# Patient Record
Sex: FEMALE | Race: WHITE | NOT HISPANIC OR LATINO | Employment: UNEMPLOYED | URBAN - METROPOLITAN AREA
[De-identification: names, ages, dates, MRNs, and addresses within clinical notes are randomized per-mention and may not be internally consistent; named-entity substitution may affect disease eponyms.]

---

## 2018-01-10 ENCOUNTER — OFFICE VISIT CONVERTED (OUTPATIENT)
Dept: PULMONOLOGY | Facility: CLINIC | Age: 58
End: 2018-01-10
Attending: INTERNAL MEDICINE

## 2018-02-05 ENCOUNTER — OFFICE VISIT CONVERTED (OUTPATIENT)
Dept: ORTHOPEDIC SURGERY | Facility: CLINIC | Age: 58
End: 2018-02-05
Attending: ORTHOPAEDIC SURGERY

## 2018-03-05 ENCOUNTER — OFFICE VISIT CONVERTED (OUTPATIENT)
Dept: ORTHOPEDIC SURGERY | Facility: CLINIC | Age: 58
End: 2018-03-05
Attending: ORTHOPAEDIC SURGERY

## 2018-04-05 ENCOUNTER — OFFICE VISIT CONVERTED (OUTPATIENT)
Dept: ORTHOPEDIC SURGERY | Facility: CLINIC | Age: 58
End: 2018-04-05
Attending: ORTHOPAEDIC SURGERY

## 2018-05-07 ENCOUNTER — OFFICE VISIT CONVERTED (OUTPATIENT)
Dept: ORTHOPEDIC SURGERY | Facility: CLINIC | Age: 58
End: 2018-05-07
Attending: ORTHOPAEDIC SURGERY

## 2018-06-25 ENCOUNTER — CONVERSION ENCOUNTER (OUTPATIENT)
Dept: ORTHOPEDIC SURGERY | Facility: CLINIC | Age: 58
End: 2018-06-25

## 2018-06-25 ENCOUNTER — OFFICE VISIT CONVERTED (OUTPATIENT)
Dept: ORTHOPEDIC SURGERY | Facility: CLINIC | Age: 58
End: 2018-06-25
Attending: ORTHOPAEDIC SURGERY

## 2018-07-19 ENCOUNTER — OFFICE VISIT CONVERTED (OUTPATIENT)
Dept: PULMONOLOGY | Facility: CLINIC | Age: 58
End: 2018-07-19
Attending: INTERNAL MEDICINE

## 2019-02-28 ENCOUNTER — OFFICE VISIT CONVERTED (OUTPATIENT)
Dept: PULMONOLOGY | Facility: CLINIC | Age: 59
End: 2019-02-28
Attending: INTERNAL MEDICINE

## 2019-05-16 ENCOUNTER — HOSPITAL ENCOUNTER (OUTPATIENT)
Dept: OTHER | Facility: HOSPITAL | Age: 59
Discharge: HOME | End: 2019-05-16
Attending: INTERNAL MEDICINE

## 2019-09-05 ENCOUNTER — OFFICE VISIT CONVERTED (OUTPATIENT)
Dept: PULMONOLOGY | Facility: CLINIC | Age: 59
End: 2019-09-05
Attending: PHYSICIAN ASSISTANT

## 2020-05-19 ENCOUNTER — OFFICE VISIT CONVERTED (OUTPATIENT)
Dept: PULMONOLOGY | Facility: CLINIC | Age: 60
End: 2020-05-19
Attending: INTERNAL MEDICINE

## 2020-06-02 ENCOUNTER — HOSPITAL ENCOUNTER (OUTPATIENT)
Dept: OTHER | Facility: HOSPITAL | Age: 60
Discharge: HOME | End: 2020-06-02
Attending: INTERNAL MEDICINE

## 2020-07-23 ENCOUNTER — OFFICE VISIT CONVERTED (OUTPATIENT)
Dept: ORTHOPEDIC SURGERY | Facility: CLINIC | Age: 60
End: 2020-07-23
Attending: ORTHOPAEDIC SURGERY

## 2020-07-23 ENCOUNTER — CONVERSION ENCOUNTER (OUTPATIENT)
Dept: ORTHOPEDIC SURGERY | Facility: CLINIC | Age: 60
End: 2020-07-23

## 2020-11-04 ENCOUNTER — OFFICE VISIT CONVERTED (OUTPATIENT)
Dept: PULMONOLOGY | Facility: CLINIC | Age: 60
End: 2020-11-04
Attending: INTERNAL MEDICINE

## 2021-05-13 ENCOUNTER — OFFICE VISIT CONVERTED (OUTPATIENT)
Dept: PULMONOLOGY | Facility: CLINIC | Age: 61
End: 2021-05-13
Attending: NURSE PRACTITIONER

## 2021-05-13 NOTE — PROGRESS NOTES
"   Progress Note      Patient Name: Hamida Laura   Patient ID: [id number]   Sex: Female   YOB: 1960        Visit Date: July 23, 2020    Provider: Kamron Hoffman MD   Location: [facility]   Location Address: [address] [place] [address]  [id number]   Location Phone: [phone]          Chief Complaint  · Followup Left Ankle Pain Status Post ORIF Ankle Fracture.       History Of Present Illness  Hamida Laura is a 60 year old /White female who presents today for followup of the left ankle. She is overall doing well today, but had been seeing her PCP because of some ankle pain and had reported some shooting pains up the back of the ankle, especially at night. Also has a \"red spot\" on the lateral aspect of the distal incision where a screw head is rubbing. There has been no redness, drainage, or signs of infection. She has been wearing normal shoes and has been weightbearing as tolerated on it. Her surgery was on 01/21/2018. Her last evaluation was in June 2018.       Past Medical History  Aftercare following surgery of the musculoskeletal system-- left ankle ORIF--nonunion; Aftercare following surgery of the musculoskeletal system-- status post left ankle ORIF; Ankle fracture, left; Arthritis; Chronic Obstructive Pulmonary Disease; Diabetes; Hyperlipemia; Hypertension; Left ankle pain, unspecified chronicity; Lung disease; Pain: Ankle; Thyroid disorder         Past Surgical History  I have had no surgeries         Allergy List  NO KNOWN DRUG ALLERGIES         Family Medical History  Stroke; Cancer, Unspecified         Social History  Alcohol (Current some day); Alcohol Use (Current some day); Claustophobic (Unknown); Homemaker.; lives alone; lives with children; Recreational Drug Use (Never); Tobacco (Current every day);          Review of Systems  · Constitutional  o Denies  o : fever, chills, weight loss  · Cardiovascular  o Denies  o : chest pain, shortness of " "breath  · Gastrointestinal  o Denies  o : liver disease, heartburn, nausea, blood in stools  · Genitourinary  o Denies  o : painful urination, blood in urine  · Integument  o Denies  o : rash, itching  · Neurologic  o Denies  o : headache, weakness, loss of consciousness  · Musculoskeletal  o Admits  o : painful, swollen joints  · Psychiatric  o Denies  o : drug/alcohol addiction, anxiety, depression      Vitals  Date Time BP Position Site L\R Cuff Size HR RR TEMP (F) WT  HT  BMI kg/m2 BSA m2 O2 Sat HC       07/23/2020 03:24 PM      101 - R   201lbs 4oz 5'  3\" 35.65 2.01 93 %          Physical Examination  · Constitutional  o Appearance  o : well developed, well-nourished, no obvious deformities present  · Head and Face  o Head  o :   § Inspection  § : normocephalic  o Face  o :   § Inspection  § : no facial lesions  · Eyes  o Conjunctivae  o : conjunctivae normal  o Sclerae  o : sclerae white  · Ears, Nose, Mouth and Throat  o Ears  o :   § External Ears  § : appearance within normal limits  § Hearing  § : intact  o Nose  o :   § External Nose  § : appearance normal  · Neck  o Inspection/Palpation  o : normal appearance  o Range of Motion  o : full range of motion  · Respiratory  o Respiratory Effort  o : breathing unlabored  o Inspection of Chest  o : normal appearance  o Auscultation of Lungs  o : no audible wheezing or rales  · Cardiovascular  o Heart  o : regular rate  · Gastrointestinal  o Abdominal Examination  o : soft and non-tender  · Skin and Subcutaneous Tissue  o General Inspection  o : intact, no rashes  · Psychiatric  o General  o : Alert and oriented x3  o Judgement and Insight  o : judgment and insight intact  o Mood and Affect  o : mood normal, affect appropriate  · Left Ankle/Foot  o Inspection  o : Screw heads are prominent over the distal aspect of the fibula plate, but there is no skin ulceration or wound. No signs of infection. There is a small area of skin redness over the distal screw head. " This is nontender. Range of motion is intact. Neurovascularly intact.   · In Office Procedures  o View  o : AP/LATERAL  o Site  o : left ankle  o Indication  o : Left ankle pain status post ORIF left ankle fracture  o Study  o : X-rays ordered, taken in the office, and reviewed today.  o Xray  o : Healed distal fibula fracture. Intact syndesmosis screws with no evidence of screw loosening or breakage. No evidence of hardware failure or backing out of screws.   o Comparative Data  o : Comparative Data found and reviewed today           Assessment  · Status post ORIF left trimalleolar ankle fracture with syndesmosis fixation 01/21/2018     V54.81  · Left ankle pain     719.47/M25.572  · Left ankle symptomatic hardware     996.78/T84.84XA  · History of fracture of left ankle     V15.51/Z87.81      Plan  · Orders  o Ankle (Left) [facility] Preferred View ([id number]) - 719.47/M25.572 - 07/23/2020  · Instructions  o Reviewed the patient's Past Medical, Social, and Family history as well as the ROS at today's visit, no changes.  o Call or return if worsening symptoms.  o This note was transcribed by Lizet Cleveland. jsb  o Patient will proceed with conservative treatment. She is not interested in hardware removal at this time. We discussed the risk of re-fracture with hardware removal. She will do activities as tolerated and follow up with us as needed.             Electronically Signed by: Lizet Cleveland-, Other -Author on July 27, 2020 10:01:57 PM  Electronically Co-signed by: Kamron Hoffman MD -Reviewer on July 28, 2020 10:31:53 PM

## 2021-05-15 VITALS — HEART RATE: 101 BPM | BODY MASS INDEX: 35.66 KG/M2 | OXYGEN SATURATION: 93 % | HEIGHT: 63 IN | WEIGHT: 201.25 LBS

## 2021-05-16 VITALS — HEIGHT: 62 IN | HEART RATE: 86 BPM | OXYGEN SATURATION: 95 % | BODY MASS INDEX: 39.01 KG/M2 | WEIGHT: 212 LBS

## 2021-05-16 VITALS — WEIGHT: 163 LBS | BODY MASS INDEX: 30 KG/M2 | OXYGEN SATURATION: 92 % | HEART RATE: 100 BPM | HEIGHT: 62 IN

## 2021-05-16 VITALS — HEIGHT: 62 IN | OXYGEN SATURATION: 90 % | HEART RATE: 88 BPM

## 2021-05-16 VITALS — HEART RATE: 98 BPM | OXYGEN SATURATION: 97 % | HEIGHT: 62 IN

## 2021-05-16 VITALS — HEART RATE: 84 BPM | OXYGEN SATURATION: 91 % | HEIGHT: 62 IN

## 2021-05-28 VITALS
RESPIRATION RATE: 16 BRPM | BODY MASS INDEX: 39.4 KG/M2 | OXYGEN SATURATION: 91 % | DIASTOLIC BLOOD PRESSURE: 87 MMHG | SYSTOLIC BLOOD PRESSURE: 192 MMHG | HEIGHT: 62 IN | RESPIRATION RATE: 14 BRPM | BODY MASS INDEX: 39.2 KG/M2 | TEMPERATURE: 98.4 F | RESPIRATION RATE: 18 BRPM | DIASTOLIC BLOOD PRESSURE: 64 MMHG | DIASTOLIC BLOOD PRESSURE: 72 MMHG | HEIGHT: 62 IN | HEIGHT: 63 IN | HEART RATE: 114 BPM | WEIGHT: 213 LBS | TEMPERATURE: 98.8 F | BODY MASS INDEX: 37.74 KG/M2 | OXYGEN SATURATION: 94 % | WEIGHT: 214.12 LBS | WEIGHT: 213 LBS | SYSTOLIC BLOOD PRESSURE: 154 MMHG | SYSTOLIC BLOOD PRESSURE: 140 MMHG | OXYGEN SATURATION: 92 % | HEART RATE: 100 BPM | HEART RATE: 108 BPM | TEMPERATURE: 98 F

## 2021-05-28 VITALS
TEMPERATURE: 98.2 F | HEIGHT: 63 IN | BODY MASS INDEX: 36.68 KG/M2 | DIASTOLIC BLOOD PRESSURE: 50 MMHG | OXYGEN SATURATION: 95 % | HEART RATE: 85 BPM | SYSTOLIC BLOOD PRESSURE: 112 MMHG | WEIGHT: 207 LBS | RESPIRATION RATE: 14 BRPM

## 2021-05-28 NOTE — PROGRESS NOTES
Patient: JESUS LAY     Acct: [id number]     Report: #[id number]  UNIT #: [id number]     : 1960    Encounter Date:2019  PRIMARY CARE: SONIA PETIT  ***Signed***  --------------------------------------------------------------------------------------------------------------------  Chief Complaint      Encounter Date      2019            Primary Care Provider      SONIA PETIT            Referring Provider      SONIA PETIT            Patient Complaint      Patient is complaining of      6 month follow up/soa            VITALS      Height 5 ft 3.00 in / 160.02 cm      Weight 213 lbs  / 96.106872 kg      BSA 1.99 m2      BMI 37.7 kg/m2      Temperature 98.4 F / 36.89 C - Oral      Pulse 114      Respirations 18      Blood Pressure 154/72 Sitting, Right Arm      Pulse Oximetry 92%, ROOM AIR      Initial Exhaled Nitrous Oxide      Exhaled Nitrous Oxide Results:  14            HPI      The patient is a 58 year old female with very severe COPD, chronic smoking     history, history of obesity hypoventilation syndrome and mild obstructive sleep     apnea. She is here for follow up.  She has an FEV1 of 1.07 liters, 30% of     predicted.  She is currently taking Symbicort and Incruse and it is helping. She    has rarely needed any rescue inhaler. The last time she used it was more than     two months ago. She has not needed any antibiotics or steroids.  She continues     to smoke several cigarettes a day.  She has tried nicotine patches and gum, but     has not helped her to quit all together, she is slowly working on it.  She     continues to use BiPAP. Her old BiPAP was not working and she needed supplies,     so when she went to pharmacy she received a new BiPAP machine. her BiPAP setting    is 20/15.  She had low dose lung cancer screening last year which was normal.     She is due for low dose lung cancer screening next month. She wants Mucinex     which has helped her when she  received it from her PCP.            ROS      Constitutional:  Complains of: Fatigue; Denies: Fever, Weight gain, Weight loss,    Chills, Insomnia, Other      Respiratory/Breathing:  Complains of: Shortness of air; Denies: Wheezing, Cough,    Hemoptysis, Pleuritic pain, Other      Endocrine:  Denies: Polydipsia, Polyuria, Heat/cold intolerance, Abnorml     menstrual pattern, Diabetes, Other      Eyes:  Denies: Blurred vision, Vision Changes, Other      Ears, nose, mouth, throat:  Denies: Mouth lesions, Thrush, Throat pain,     Hoarseness, Allergies/Hay Fever, Post Nasal Drip, Headaches, Recent Head Injury,    Nose Bleeding, Neck Stiffness, Thyroid Mass, Hearing Loss, Ear Fullness, Dry     Mouth, Nasal or Sinus Pain, Dry Lips, Nasal discharge, Nasal congestion, Other      Cardiovascular:  Denies: Palpitations, Syncope, Claudication, Chest Pain, Wake     up Gasping for air, Leg Swelling, Irregular Heart Rate, Cyanosis, Dyspnea on     Exertion, Other      Gastrointestinal:  Denies: Nausea, Constipation, Diarrhea, Abdominal pain,     Vomiting, Difficulty Swallowing, Reflux/Heartburn, Dysphagia, Jaundice,     Bloating, Melena, Bloody stools, Other      Genitourinary:  Denies: Urinary frequency, Incontinence, Hematuria, Urgency,     Nocturia, Dysuria, Testicular problems, Other      Musculoskeletal:  Denies: Joint Pain, Joint Stiffness, Joint Swelling, Myalgias,    Other      Hematologic/lymphatic:  DENIES: Lymphadenopathy, Bruising, Bleeding tendencies,     Other      Neurological:  Denies: Headache, Numbness, Weakness, Seizures, Other      Psychiatric:  Denies: Anxiety, Appropriate Effect, Depression, Other      Sleep:  No: Excessive daytime sleep, Morning Headache?, Snoring, Insomnia?, Stop    breathing at sleep?, Other      Integumentary:  Denies: Rash, Dry skin, Skin Warm to Touch, Other      Immunologic/Allergic:  Denies: Latex allergy, Seasonal allergies, Asthma,     Urticaria, Eczema, Other      Immunization  status:  No: Up to date            FAMILY/SOCIAL/MEDICAL HX      Surgical History:  Yes: Abdominal Surgery, Oral Surgery (TEETH EXTRACTION); No:     AAA Repair, Angioplasty, Appendectomy, Back Surgery, Bladder Surgery, Bowel     Surgery, Breast Surgery, CABG, Carotid Stenosis, Cholecystectomy, Ear Surgery,     Eye Surgery, Head Surgery, Hernia Surgery, Kidney Surgery, Nose Surgery,     Orthopedic Surgery, Prostatectomy, Rectal Surgery, Spinal Surgery, Testicular     Surgery, Throat Surgery, Valve Replacement, Vascular Surgery, Other Surgeries      Stroke - Family Hx:  Brother      Cancer/Type - Family Hx:  Mother      Is Father Still Living?:  No      Is Mother Still Living?:  No       Family History:  Yes      Social History:  Tobacco Use; No Alcohol Use, No Recreational Drug use      Smoking status:  Current every day smoker ( 1 ppd for 40 years )      Anticoagulation Therapy:  No      Antibiotic Prophylaxis:  No      Medical History:  Yes: Chronic Bronchitis/COPD, Diabetes (TYPE II), High Blood     Pressure, Thyroid Problem; No: Alcoholism, Allergies, Anemia, Arthritis, Asthma,    Blood Disease, Broken Bones, Cataracts, Chemical Dependency,     Chemotherapy/Cancer, Emphysema, Chronic Liver Disease, Colon Trouble, Colitis,     Diverticulitis, Congestive Heart Failu, Deafness or Ringing Ears, Convulsions,     Depression, Anxiety, Bipolar Disorder, Epilepsy, Seizures, Forgetfullness,     Glaucoma, Gall Stones, Gout, Head Injury, Heart Attack, Heart Murmur,     Hemorrhoids/Rectal Prob, Hepatitis, Hiatal Hernia, High Cholesterol, HIV (Do not    ask - volu, Jaundice, Kidney or Bladder Disease, Kidney Stones, Migrane     Headaches, Mitral Valve Prolapse, Night sweats, Phlebitis, Psychiatric Care,     Reflux Disease, Rheumatic Fever, Sexually Transmitted Dis, Shortness Of Breath,     Sinus Trouble, Skin Disease/Psoriais/Ecz, Stroke, Tuberculosis or Pos TB Te,     Miscellaneous Medical/oth (HIGH CHOLESTEROL)       Psychiatric History      none            PREVENTION      Hx Influenza Vaccination:  No      Date Influenza Vaccine Given:  Sep 1, 2018      Influenza Vaccine Declined:  Yes      2 or More Falls Past Year?:  No      Fall Past Year with Injury?:  No      Hx Pneumococcal Vaccination:  Yes      Encouraged to follow-up with:  PCP regarding preventative exams.      Chart initiated by      gilda joe ma            ALLERGIES/MEDICATIONS      Allergies:        Coded Allergies:             NO KNOWN ALLERGIES (Unverified , 2/28/19)      Medications    Last Reconciled on 2/28/19 13:15 by REYMUNDO LOMAX MD      Lisinopril* (Lisinopril*) 5 Mg Tablet      5 MG PO QDAY, #60 TAB 0 Refills         Reported         7/19/18       Metformin Hcl* (Metformin Hcl*) 850 Mg Tablet      850 MG PO QDAY, #30 TAB 0 Refills         Reported         1/20/18       Levothyroxine (Levothyroxine) 0.15 Mg Tablet      0.15 MG PO QDAY@07, #30 TAB 0 Refills         Reported         1/20/18       Nicotine 21 Mg Patch (Nicoderm 21 Mg Patch) 1 Each Patch.td24      21 MG TRANSDERM QDAY for 30 Days, #30 PATCH 3 Refills         Prov: Reymundo Lomax         1/10/18       Umeclidinium Bromide (Incruse Ellipta) 62.5 Mcg Blst.w.dev      1 PUFF INH RTQDAY, #1 MDI 3 Refills         Prov: Reymundo Lomax         11/20/17       MDI-Albuterol (Ventolin HFA) 8 Gm Hfa.aer.ad      2 PUFFS INH Q4-6H PRN for DYSPNEA, #1 INH 6 Refills         Prov: Reymundo Lomax         8/4/17       Budesonide/Formoterol Fumarate (Symbicort 160/4.5 Mcg) 10.2 Gm Inh      2 PUFF INH BID, #1 INH 9 Refills         Prov: Reymundo Lomax         8/4/17       Albuterol Sulfate (Albuterol Sulfate) 1.25 Mg/3 Ml Vial.neb      1.25 MG INH Q4-6H PRN for SHORTNESS OF BREATH, #120 NEB         Prov: LIBERTAD CARMEN         9/10/16       Alprazolam (Alprazolam) 0.5 Mg Tablet      0.5 MG PO TID PRN for ANXIETY, #90 TAB         Reported         9/8/16       Simvastatin (Simvastatin*) 20 Mg Tablet      20 MG PO HS,  #30 TAB 0 Refills         Reported         9/8/16       amLODIPine (amLODIPine) 5 Mg Tablet      5 MG PO QDAY, #30 TAB         Reported         9/8/16      Current Medications      Current Medications Reviewed 2/28/19            EXAM      CONSTITUTIONAL: Pleasant morbidly obese female with plethoric look in no acute     distress, normal conversant. She is very tearful  during our conversation.        EYES : Pink conjunctive, no ptosis, PERRL.       ENMT :Mallampati classification IV, no sinus tenderness.  Nose and ears appear     normal, normal dentition, mild posterior pharyngeal wall erythema.      Neck: Nontender, no masses, no thyromegaly, no nodules.      Resp : Bilateral diminished  breath sounds, resonant to percussion bilaterally,     no wheezing, crackles or rhonchi.      CVS  : No carotid bruits, s1s2 nl, RRR, no murmur, rubs or gallop, trace pedal     edema in bilateral lower extremities, nontender on palpation.       Chest wall: Increased AP diameter.  Normal rise with inspiration, nontender on     palpation      GI   : Abdomen soft, with no masses, no hepatosplenomegaly, no hernias, BS+      MSK  : Normal gait and station, no digital cyanosis or clubbing       Skin : No rashes, ulcerations or lesions, normal turgor and temperature      Neuro: CN II - XII intact, no sensory deficits, DTRs intact and symmetrical, no     motor weakness      Psych: Appropriate affect, A   Vtials      Vitals:             Height 5 ft 3.00 in / 160.02 cm           Weight 213 lbs  / 96.567565 kg           BSA 1.99 m2           BMI 37.7 kg/m2           Temperature 98.4 F / 36.89 C - Oral           Pulse 114           Respirations 18           Blood Pressure 154/72 Sitting, Right Arm           Pulse Oximetry 92%, ROOM AIR            REVIEW      Results Reviewed      P[facility]S Results Reviewed?:  Yes Prev Lab Results, Yes Prev Radiology Results, Yes P    revious Mecial Records      Radiographic Results               [facility]  [facility]                PACS RADIOLOGY REPORT            Patient: JESUS LAY   Acct: #[id number]   Report: #[id number]            UNIT #: [id number]    DOS: 18      INSURANCE:ADOP   ORDER #:[id number]      LOCATION:[facility]     : 1960            PROVIDERS      ADMITTING:     ATTENDING: Reymundo Lomax      FAMILY:  NONE,MD   ORDERING:  Reymundo Lomax         OTHER:    DICTATING:  Patricio Taylor MD            REQ #:[id number]   EXAM:[facility] - LOW DOSE CHEST CT SCREENING      REASON FOR EXAM:        REASON FOR VISIT:  FORMER SMOKER            *******Signed******         PROCEDURE:   CT LOW DOSE CHEST SCREENING             COMPARISON:   None.             REASON FOR SCREENING:   Patient is 57 years of age, asymptomatic, and has a     smoking history of more       than 30 pack years.      SMOKING STATUS:   Former smoker.  Years since quitting:                 SCREENING VISIT:   Year 1.                   TECHNIQUE:   Axial unenhanced LDCT images from the apices through mid-kidney     were obtained.       Evaluation of solid organs and vascular structures is suboptimal due to the lack    of IV contrast.       Imaging was performed on a Siemens Emotion CT scanner.             RADIATION:   CT Dose Index Vol (CTDIvol):    3.11  mGy         Dose Length Product (DLP):    110  mGy-cm             DIAGNOSTIC QUALITY:   Satisfactory.               FINDINGS:      No well-defined consolidations or significant pleural effusions are observed.      Mild to moderate       bullous changes are seen throughout the lungs most pronounced in the lung     apices.  The findings       indicate mild to moderate emphysema.  No dominant pulmonary nodules or abnormal     pulmonary masses       are seen.              No significant hilar, mediastinal, or axillary lymphadenopathy is seen. A normal    aortic arch       branching pattern is noted.  Moderate  atherosclerosis is noted.  Severe coronary    artery       calcifications are identified.  The thyroid gland is unremarkable. The esophagus    is unremarkable.             The limited evaluation of the upper abdomen demonstrates no definitive acute     abnormalities.  There       is a low-density focus involving the left adrenal gland.  This finding     demonstrates fat attenuation       and is consistent with an incidental adenoma.  The finding measures 2.5 cm.             No acute osseous abnormalities are seen.                CONCLUSION:         1. No evidence for acute intrathoracic abnormality.      2. Evidence for mild to moderate emphysema.      3. No dominant pulmonary nodules or masses are identified.      4. Evidence for severe coronary artery calcifications.      5. Note is made of an incidental left adrenal adenoma.             LUNG-RADS CLASSIFICATION:   Lung rads category 2, benign findings.  Intermittent    yearly screening may       be considered.              MINISTERIO SOLIZ MD             Electronically Signed and Approved By: MINISTERIO SOLIZ MD on 3/07/2018 at 10:50                           Until signed, this is an unconfirmed preliminary report that may contain      errors and is subject to change.                                              HAZDA:      D:03/07/18 1050      PFT Results      [id number]  [id number]                                       [facility]                          [employer]                            [place], [place]  [address]               __________________________________________________________________________             Patient Name:                   Attending Physician:      Hamida Laura M.D.             Patient Visit # MR #            Admit Date  Disch Date     Location      [id number]    [id number]      09/29/2016                 [facility]- -             Date of Birth       1960      __________________________________________________________________________      821 - DIAGNOSTIC REPORT             PULMONARY FUNCTION TEST             DATE OF SERVICE:      9/29/2016             PRIMARY CARE PHYSICIAN:      Leno Hidalgo M.D.             SPIROMETRY:      Spirometry shows severe obstructive process.      FEV1/FVC ratio is 58%.      FEV1 is 1.07 L, 38% of predicted.      FVC is 1.85 L, 52% of predicted.      Bronchodilator response:  There is a significant response to bronchodilator      administration. FEV increased from 1.07 L to 1.30 L, 22% increase.  FVC      increased from 1.85 L to 2.29 L, 24% change.             LUNG VOLUMES:      Lung volumes show air trapping.      Total lung capacity is 5.19 L, 99% of predicted.      Residual volume is 3.13 L, 161% of predicted.             DIFFUSION:      Diffusion capacity is moderately decreased at 58% of predicted.             FLOW VOLUME LOOP:      Flow volume loop is compatible with obstructive process.             CONCLUSION:        1. Low FEV1/FVC with low FEV1 and FVC with air trapping and low diffusion           capacities, suggestive of severe obstructive airway disease, likely           emphysema.        2. There is some reversible component to her obstructive airway disease.        3. Please correlate clinically.             To be electronically signed in [employer]      [id number] REYMUNDO LOMAX M.D.             NK:rupa      D:  10/04/2016 16:23      T:  10/04/2016 19:16      #[id number]             Until signed, this is an unconfirmed preliminary report that may contain      errors and is subject to change.                   10/06/16 1404  <Electronically signed by Reymundo Lomax MD>            Assessment      Notes      Changed Medications      * Lisinopril* 5 MG TABLET: 5 MG PO QDAY #60      Discontinued Medications      * UMECLIDINIUM BROMIDE (Incruse Ellipta) 62.5 MCG BLST.W.DEV: 1 PUFF INH RTQDAY       #1      * Fluticasone/Vilanterol  200-25 Mcg Inh (Breo Ellipta 200-25 Mcg Inh) 1 EACH       BLST.W.DEV: 1 PUFF INH QDAY 30 Days #1      New Diagnostics      * Low Dose Chest CT, SCHEDULED PROCEDURE         Dx: Tobacco use disorder - F17.200      New Office Procedures      * Pneumovax-23, As Soon As Possible         Pneumococcal Vaccine Polyvalen (Pneumovax-23) 25 MCG/0.5 ML VIAL: 25        MICROGRAM INTRAMUSCULARLY Qty 25 MCG      PLAN:      The patient is a 58 year old female with very severe chronic obstructive     pulmonary disease, chronic smoking history, history of  obesity hyperventilation    syndrome and mild obstructive sleep apnea.             1. Chronic obstructive pulmonary disease. FEV1 is at 1.07 liters, 30% predicted.    Continue with Symbicort two puffs twice daily, Incruse daily and albuterol as     needed.  She needs to quit smoking.              2. Smoking cessation. Counseling was done for more than 5 minutes. She has     nicotine patch and gum at home, but continues to smoke several cigarettes a day.     She is going to work on it slowly.  We have set a quit date prior to next     office visit, but she says cant, she has tried it in the past and is not able to    .              3. Obesity hyperventilation syndrome and obstructive sleep apnea. Continue with     BiPAP 20/15. We will try to obtain the records from [facility] regarding     her BiPAP usage. She says she has good adherence to it and it does help her     significantly. We will give her Mucinex for one week.  We will administered     Pneumovax today.             4.  I will order low dose lung cancer screening CT scan of the chest for next     month.              5. I will follow up with her in 6 months, earlier if needed.            Patient Education      Education resources provided:  Yes      Patient Education Provided:  Acute Bronchitis                 Disclaimer: Converted document may not contain table formatting or lab diagrams. Please see [employer] for the authenticated document.

## 2021-05-28 NOTE — PROGRESS NOTES
Patient: JESUS LAY     Acct: [id number]     Report: #[id number]  UNIT #: [id number]     : 1960    Encounter Date:2019  PRIMARY CARE: SONIA PETIT  ***Signed***  --------------------------------------------------------------------------------------------------------------------  Chief Complaint      Encounter Date      Sep 5, 2019            Primary Care Provider      SONIA PETIT            Referring Provider      SONIA PETIT            Patient Complaint      Patient is complaining of      Patient here today for 6 month follow up            VITALS      Height 63 in / 160.02 cm      Weight 207 lbs  / 93.342877 kg      BSA 1.96 m2      BMI 36.7 kg/m2      Temperature 98.2 F / 36.78 C - Oral      Pulse 85      Respirations 14      Blood Pressure 112/50 Sitting, Left Arm      Pulse Oximetry 95%, room air      Initial Exhaled Nitrous Oxide      Exhaled Nitrous Oxide Results:  14            HPI      The patient is a very pleasant 59 year old white female patient of Dr. Lomax's     here for 6 month follow up. She has a history of severe chronic obstructive     pulmonary disease, chronic smoking, smoking 3-5 cigarettes per day, history of     obesity hyperventilation syndrome on nightly BiPAP and obstructive sleep apnea.     She is here today stating she has done fairly well in the past 6 months. She has    been on Symbicort 160 and incruse but recently got a letter saying [employer] will    no longer cover her Symbicort and she needs to be changed to advair. She feels     like her allergies are bothering her more recently. She has had some mildly     increased dry cough. She is taking Mucinex and using her albuterol breathing     treatments and it is almost resolved. She denies hemoptysis, fever or chills,     wheezing or  purulent sputum production. She denies increased dyspnea. She had     low dose lung cancer screening CT scan in May 2019 that I reviewed with her. She    had a tiny  1-2 mm nodule in the right lower lobe that is stable. There were no     new or suspicious nodules seen.             I reviewed her Review of Systems, medical, surgical and family history and agree    with those as entered.      Copies To:   Reymundo Lomax      Constitutional:  Denies: Fatigue, Fever, Weight gain, Weight loss, Chills,     Insomnia, Other      Respiratory/Breathing:  Complains of: Cough; Denies: Shortness of air, Wheezing,    Hemoptysis, Pleuritic pain, Other      Endocrine:  Denies: Polydipsia, Polyuria, Heat/cold intolerance, Abnorml men    strual pattern, Diabetes, Other      Eyes:  Denies: Blurred vision, Vision Changes, Other      Ears, nose, mouth, throat:  Denies: Congestion, Dysphagia, Hearing Changes, Nose    Bleeding, Nasal Discharge, Throat pain, Tinnitus, Other      Cardiovascular:  Denies: Chest Pain, Exertional dyspnea, Peripheral Edema,     Palpitations, Syncope, Wake up Gasping for air, Orthopnea, Tachycardia, Other      Gastrointestinal:  Denies: Abdominal pain/cramping, Bloody stools, Constipation,    Diarrhea, Melena, Nausea, Vomiting, Other      Genitourinary:  Denies: Dysuria, Urinary frequency, Incontinence, Hematuria,     Urgency, Other      Musculoskeletal:  Denies: Joint Pain, Joint Stiffness, Joint Swelling, Myalgias,    Other      Hematologic/lymphatic:  DENIES: Lymphadenopathy, Bruising, Bleeding tendencies,     Other      Neurologic:  Denies: Headache, Numbness, Weakness, Seizures, Other      Psychiatric:  Denies: Anxiety, Appropriate Effect, Depression, Other      Sleep:  No: Excessive daytime sleep, Morning Headache?, Snoring, Insomnia?, Stop    breathing at sleep?, Other      Integumentary:  Denies: Rash, Dry skin, Skin Warm to Touch, Other            FAMILY/SOCIAL/MEDICAL HX      Surgical History:  Yes: Abdominal Surgery, Oral Surgery (TEETH EXTRACTION); No:     AAA Repair, Angioplasty, Appendectomy, Back Surgery, Bladder Surgery, Bowel     Surgery,  Breast Surgery, CABG, Carotid Stenosis, Cholecystectomy, Ear Surgery,     Eye Surgery, Head Surgery, Hernia Surgery, Kidney Surgery, Nose Surgery,     Orthopedic Surgery, Prostatectomy, Rectal Surgery, Spinal Surgery, Testicular     Surgery, Throat Surgery, Valve Replacement, Vascular Surgery, Other Surgeries      Stroke - Family Hx:  Brother      Cancer/Type - Family Hx:  Mother      Is Father Still Living?:  No      Is Mother Still Living?:  No       Family History:  Yes      Social History:  Tobacco Use; No Alcohol Use, No Recreational Drug use      Smoking status:  Current every day smoker ( 1 ppd for 40 years )      Anticoagulation Therapy:  No      Antibiotic Prophylaxis:  No      Medical History:  Yes: Chronic Bronchitis/COPD, Diabetes (TYPE II), High Blood     Pressure, Thyroid Problem; No: Alcoholism, Allergies, Anemia, Arthritis, Asthma,    Blood Disease, Broken Bones, Cataracts, Chemical Dependency,     Chemotherapy/Cancer, Emphysema, Chronic Liver Disease, Colon Trouble, Colitis,     Diverticulitis, Congestive Heart Failu, Deafness or Ringing Ears, Convulsions,     Depression, Anxiety, Bipolar Disorder, Epilepsy, Seizures, Forgetfullness,     Glaucoma, Gall Stones, Gout, Head Injury, Heart Attack, Heart Murmur,     Hemorrhoids/Rectal Prob, Hepatitis, Hiatal Hernia, High Cholesterol, HIV (Do not    ask - volu, Jaundice, Kidney or Bladder Disease, Kidney Stones, Migrane     Headaches, Mitral Valve Prolapse, Night sweats, Phlebitis, Psychiatric Care,     Reflux Disease, Rheumatic Fever, Sexually Transmitted Dis, Shortness Of Breath,     Sinus Trouble, Skin Disease/Psoriais/Ecz, Stroke, Tuberculosis or Pos TB Te,     Miscellaneous Medical/oth (HIGH CHOLESTEROL)      Psychiatric History      none            PREVENTION      Hx Influenza Vaccination:  No      Date Influenza Vaccine Given:  Sep 1, 2018      Influenza Vaccine Declined:  Yes      2 or More Falls Past Year?:  No      Fall Past Year with Injury?:   No      Hx Pneumococcal Vaccination:  Yes      Encouraged to follow-up with:  PCP regarding preventative exams.      Chart initiated by      Alison Head CMA            ALLERGIES/MEDICATIONS      Allergies:        Coded Allergies:             NO KNOWN ALLERGIES (Unverified , 9/5/19)      Medications    Last Reconciled on 9/5/19 11:35 by FREDRICK SCHAEFFER      Lisinopril* (Lisinopril*) 5 Mg Tablet      5 MG PO QDAY, #60 TAB 0 Refills         Reported         7/19/18       Metformin Hcl* (Metformin Hcl*) 850 Mg Tablet      850 MG PO QDAY, #30 TAB 0 Refills         Reported         1/20/18       Levothyroxine (Levothyroxine) 0.15 Mg Tablet      0.15 MG PO QDAY@07, #30 TAB 0 Refills         Reported         1/20/18       Umeclidinium Bromide (Incruse Ellipta) 62.5 Mcg Blst.w.dev      1 PUFF INH RTQDAY, #1 MDI 3 Refills         Prov: Reymundo Lomax         11/20/17       MDI-Albuterol (Ventolin HFA) 8 Gm Hfa.aer.ad      2 PUFFS INH Q4-6H PRN for DYSPNEA, #1 INH 6 Refills         Prov: Reymundo Lomax         8/4/17       Budesonide/Formoterol Fumarate (Symbicort 160/4.5 Mcg) 10.2 Gm Inh      2 PUFF INH BID, #1 INH 9 Refills         Prov: Reymundo Lomax         8/4/17       Albuterol Sulfate (Albuterol Sulfate) 1.25 Mg/3 Ml Vial.neb      1.25 MG INH Q4-6H PRN for SHORTNESS OF BREATH, #120 NEB         Prov: LIBERTAD CARMEN         9/10/16       Alprazolam (Alprazolam) 0.5 Mg Tablet      0.5 MG PO TID PRN for ANXIETY, #90 TAB         Reported         9/8/16       Simvastatin (Simvastatin*) 20 Mg Tablet      20 MG PO HS, #30 TAB 0 Refills         Reported         9/8/16       amLODIPine (amLODIPine) 5 Mg Tablet      5 MG PO QDAY, #30 TAB         Reported         9/8/16      Current Medications      Current Medications Reviewed 9/5/19            EXAM      CONSTITUTIONAL: Pleasant  normal conversant.       EYES : Pink conjunctive, no ptosis, PERRL.       ENMT : Nose and ears appear normal, normal dentition, mild posterior pharyngeal      wall erythema, no sinus tenderness. Mallampati classification       Neck: Nontender, no masses, no thyromegaly, no nodules.      Resp : Mildly decreased breath sounds throughout, no wheezes, rhonchi or     crackles, normal work of breathing noted.        CVS  : No carotid bruits, s1s2 nl, RRR, no murmur, rubs or gallop, no peripheral    edema       Chest wall: Normal rise with inspiration, nontender on palpation.      GI   : Abdomen soft, with no masses, no hepatosplenomegaly, no hernias, BS+      MSK  : Normal gait and station, no digital cyanosis or clubbing       Skin : No rashes, ulcerations or lesions, normal turgor and temperature      Neuro: CN II - XII intact, no sensory deficits, DTRs intact and symmetrical, no     motor weakness      Psych: Appropriate affect, A   Vitals      Vitals:             Height 63 in / 160.02 cm           Weight 207 lbs  / 93.159734 kg           BSA 1.96 m2           BMI 36.7 kg/m2           Temperature 98.2 F / 36.78 C - Oral           Pulse 85           Respirations 14           Blood Pressure 112/50 Sitting, Left Arm           Pulse Oximetry 95%, room air            REVIEW      Results Reviewed      [facility] Results Reviewed?:  Yes Prev Lab Results, Yes Prev Radiology Results, Yes     Previous [facility] Records            Assessment      Notes      New Medications      * MDI-Advair 500/50 (Advair 500/50 Diskus) 1 EACH BLST.W.DEV: 1 PUFF INH RTBID       #1      * Nicotine Polacrilex (Nicotine) 2 MG LOZENGE: 2 MG BUCCAL Q4-6H #180      Renewed Medications      * Albuterol Sulfate 1.25 MG/3 ML VIAL.NEB: 1.25 MG INH Q4-6H PRN SHORTNESS OF       BREATH #120      Discontinued Medications      * Budesonide/Formoterol Fumarate (Symbicort 160/4.5 Mcg) 10.2 GM INH: 2 PUFF INH      BID #1      * Nicotine 21 Mg Patch (Nicoderm 21 Mg Patch) 1 EACH PATCH.TD24: 21 MG TRANSDERM      QDAY 30 Days #30      ASSESSMENT:       1. Severe chronic obstructive pulmonary disease without acute exacerbation.        2. Ongoing tobacco abuse of cigarettes.       3. Obesity hyperventilation syndrome and obstructive sleep apnea on nightly     BiPAP        4. Morbid obesity with BMI 36.7.            PLAN:      1. Continue incruse 1 puff daily and due to her insurance coverage changing, I     will change Symbicort 160 to advair discus 500/50 1 puff twice daily. Continue     albuterol as needed. I instructed her to try advair for at least 1 month and if     she feels like she does worse or it does not help, she is to let us know and we     will document if she fails advair to try to get Symbicort covered for her.       2. I counseled the patient on smoking cessation for 5 minutes.  I offered     nicotine replacement therapy and  pharmacotherapy. She wishes to try nicotine     lozenges so I have prescribed those today. I encouraged her to stop smoking all     together and she verbalized understanding.       3. Continue BiPAP at current settings. I will review her BiPAP usage when it is     available.       4. I discussed lung cancer screening CT scan of the chest with her and she had n    o new or suspicious nodules. There is only a tiny 1-2 mm nodule that is     unchanged in the right lower lobe. Continue annual lung cancer screening.       5. I encouraged her to increase her activity as tolerated and work on weight     loss by decreasing her caloric intake. I offered dietitian referral but she     declines.       6. Follow up in 6 months with Dr. Lomax, sooner if needed.            Patient Education      ACO BMI High above 25:  Counseling Given, Encouraged weight loss, Encourage     dietary changes      Tobacco Cessation Counseling:  for 3 - 10 minutes      Patient Education Provided:  COPD, How to use an Inhaler, How to use a     Nebulizer, Smoking Cessation      Time Spent:  > 50% /[facility]                 Disclaimer: Converted document may not contain table formatting or lab diagrams. Please see [employer] for the authenticated document.

## 2021-05-28 NOTE — PROGRESS NOTES
Patient: JESUS LAY     Acct: [id number]     Report: #[id number]  UNIT #: [id number]     : 1960    Encounter Date:2018  PRIMARY CARE: SONIA PETIT  ***Signed***  --------------------------------------------------------------------------------------------------------------------  Chief Complaint      Encounter Date      2018            Primary Care Provider      SONIA PETIT            Referring Provider      SONIA PETIT            Patient Complaint      Patient is complaining of      3 month follow up            VITALS      Height 62 in / 157.48 cm      Weight 213 lbs 0 oz / 96.621506 kg      BSA 2.11 m2      BMI 39.0 kg/m2      Temperature 98.0 F / 36.67 C - Oral      Pulse 100      Respirations 16      Blood Pressure 140/64 Sitting, Right Arm      Pulse Oximetry 94%, room air      Exhaled Nitrous Oxide Testin            HPI      The patient is a 58 year old female with  very severe chronic obstructive     pulmonary disease, chronic smoking history, obesity hypoventilation syndrome     and mild obstructive sleep apnea. She is here for follow up.             Since her last office visit in 2018 she had tibia and fibula fracture     and needed surgery. Since then she has been in rehab with the orthopedic     doctors. She continues to smoke 10-20 cigarettes a day. She has been using     BiPAP with sleep . She gets her supplies through [facility] pharmacy.     She uses a full face mask. She continues to take Symbicort two puffs twice daily    , Incruse once daily and albuterol as needed.  She has sparingly needed     albuterol at all. She has no weight loss or loss of appetite, no coughing up     blood, no nausea and vomiting. She had low dose CT scan of the chest done on . The results of the test was reviewed with her today. She was very     tearful about daughter's miscarriage recently. She says she has a lot of stress     and is not able  to stop smoking right now. She has nicotine patch and gum at     home.            ROS      Constitutional:  Complains of: Fatigue, Denies: Fever, Weight gain, Weight loss    , Chills, Insomnia, Other      Respiratory/Breathing:  Complains of: Shortness of air, Denies: Wheezing, Cough    , Hemoptysis, Pleuritic pain, Other      Endocrine:  Denies: Polydipsia, Polyuria, Heat/cold intolerance, Abnorml     menstrual pattern, Diabetes, Other      Eyes:  Denies: Blurred vision, Vision Changes, Other      Ears, nose, mouth, throat:  Denies: Mouth lesions, Thrush, Throat pain,     Hoarseness, Allergies/Hay Fever, Post Nasal Drip, Headaches, Recent Head Injury    , Nose Bleeding, Neck Stiffness, Thyroid Mass, Hearing Loss, Ear Fullness, Dry     Mouth, Nasal or Sinus Pain, Dry Lips, Nasal discharge, Nasal congestion, Other      Cardiovascular:  Denies: Palpitations, Syncope, Claudication, Chest Pain, Wake     up Gasping for air, Leg Swelling, Irregular Heart Rate, Cyanosis, Dyspnea on     Exertion, Other      Gastrointestinal:  Denies: Nausea, Constipation, Diarrhea, Abdominal pain,     Vomiting, Difficulty Swallowing, Reflux/Heartburn, Dysphagia, Jaundice, Bloating    , Melena, Bloody stools, Other      Genitourinary:  Denies: Urinary frequency, Incontinence, Hematuria, Urgency,     Nocturia, Dysuria, Testicular problems, Other      Musculoskeletal:  Denies: Joint Pain, Joint Stiffness, Joint Swelling, Myalgias    , Other      Hematologic/lymphatic:  DENIES: Lymphadenopathy, Bruising, Bleeding tendencies,     Other      Neurological:  Denies: Headache, Numbness, Weakness, Seizures, Other      Psychiatric:  Denies: Anxiety, Appropriate Effect, Depression, Other      Sleep:  No: Excessive daytime sleep, Morning Headache?, Snoring, Insomnia?,     Stop breathing at sleep?, Other      Integumentary:  Denies: Rash, Dry skin, Skin Warm to Touch, Other      Immunologic/Allergic:  Denies: Latex allergy, Seasonal allergies, Asthma,      Urticaria, Eczema, Other      Immunization status:  No: Up to date            FAMILY/SOCIAL/MEDICAL HX      Surgical History:  Yes: Abdominal Surgery, Oral Surgery (TEETH EXTRACTION), No:     AAA Repair, Angioplasty, Appendectomy, Back Surgery, Bladder Surgery, Bowel     Surgery, Breast Surgery, CABG, Carotid Stenosis, Cholecystectomy, Ear Surgery,     Eye Surgery, Head Surgery, Hernia Surgery, Kidney Surgery, Nose Surgery,     Orthopedic Surgery, Prostatectomy, Rectal Surgery, Spinal Surgery, Testicular     Surgery, Throat Surgery, Valve Replacement, Vascular Surgery, Other Surgeries      Stroke - Family Hx:  Brother      Cancer/Type - Family Hx:  Mother      Is Father Still Living?:  No      Is Mother Still Living?:  No       Family History:  Yes      Social History:  Tobacco Use, No Alcohol Use, No Recreational Drug use      Smoking status:  Current every day smoker ( 1 ppd for 40 years )      Anticoagulation Therapy:  No      Antibiotic Prophylaxis:  No      Medical History:  Yes: Chronic Bronchitis/COPD, Diabetes (TYPE II), High Blood     Pressure, Thyroid Problem, No: Alcoholism, Allergies, Anemia, Arthritis, Asthma    , Blood Disease, Broken Bones, Cataracts, Chemical Dependency, Chemotherapy/    Cancer, Emphysema, Chronic Liver Disease, Colon Trouble, Colitis, Diverticulitis    , Congestive Heart Failu, Deafness or Ringing Ears, Convulsions, Depression,     Anxiety, Bipolar Disorder, Epilepsy, Seizures, Forgetfullness, Glaucoma, Gall     Stones, Gout, Head Injury, Heart Attack, Heart Murmur, Hemorrhoids/Rectal Prob,     Hepatitis, Hiatal Hernia, High Cholesterol, HIV (Do not ask - volu, Jaundice,     Kidney or Bladder Disease, Kidney Stones, Migrane Headaches, Mitral Valve     Prolapse, Night sweats, Phlebitis, Psychiatric Care, Reflux Disease, Rheumatic     Fever, Sexually Transmitted Dis, Shortness Of Breath, Sinus Trouble, Skin     Disease/Psoriais/Ecz, Stroke, Tuberculosis or Pos TB Te, Miscellaneous  Medical/    oth (HIGH CHOLESTEROL)      Psychiatric History      none            PREVENTION      Hx Influenza Vaccination:  No      Date Influenza Vaccine Given:  Sep 19, 2016      Influenza Vaccine Declined:  Yes      2 or More Falls Past Year?:  No      Fall Past Year with Injury?:  No      Hx Pneumococcal Vaccination:  No      Encouraged to follow-up with:  PCP regarding preventative exams.      Chart initiated by      gilda joe ma            ALLERGIES/MEDICATIONS      Allergies:        Coded Allergies:             NO KNOWN ALLERGIES (Unverified , 7/19/18)      Medications    Last Reconciled on 7/19/18 15:58 by REYMUNDO LOMAX MD      Umeclidinium Bromide (Incruse Ellipta) 62.5 Mcg Blst.w.dev      1 PUFF INH RTQDAY, #1 MDI 9 Refills         Prov: Reymundo Lomax         7/19/18       Budesonide/Formoterol Fumarate (Symbicort 160/4.5 Mcg) 10.2 Gm Inh      2 PUFF INH BID, #1 INH 9 Refills         Prov: Reymundo Lomax         7/19/18       Lisinopril* (Lisinopril*) 5 Mg Tablet      5 MG PO BID, #60 TAB 0 Refills         Reported         7/19/18       Metformin Hcl* (Metformin Hcl*) 850 Mg Tablet      850 MG PO QDAY, #30 TAB 0 Refills         Reported         1/20/18       Levothyroxine (Levothyroxine) 0.15 Mg Tablet      0.15 MG PO QDAY@07, #30 TAB 0 Refills         Reported         1/20/18       Nicotine 21 Mg Patch (Nicoderm 21 Mg Patch) 1 Each Patch.td24      21 MG TRANSDERM QDAY for 30 Days, #30 PATCH 3 Refills         Prov: Reymundo Lomax         1/10/18       Umeclidinium Bromide (Incruse Ellipta) 62.5 Mcg Blst.w.dev      1 PUFF INH RTQDAY, #1 MDI 3 Refills         Prov: Reymundo Lomax         11/20/17       MDI-Albuterol (Ventolin HFA*) 8 Gm Hfa.aer.ad      2 PUFFS INH Q4-6H Y for DYSPNEA, #1 INH 6 Refills         Prov: Reymundo Lomax         8/4/17       Budesonide/Formoterol Fumarate (Symbicort 160/4.5 Mcg) 10.2 Gm Inh      2 PUFF INH BID, #1 INH 9 Refills         Prov: Reymundo Lomax         8/4/17       Albuterol  Sulfate (Albuterol Sulfate) 1.25 Mg/3 Ml Vial.neb      1.25 MG INH Q4-6H Y for SHORTNESS OF BREATH, #120 NEB         Prov: LIBERTAD CARMEN         9/10/16       Alprazolam (Alprazolam) 0.5 Mg Tablet      0.5 MG PO TID Y for ANXIETY, #90 TAB         Reported         9/8/16       Simvastatin (Simvastatin*) 20 Mg Tablet      20 MG PO HS, #30 TAB 0 Refills         Reported         9/8/16       amLODIPine (amLODIPine) 5 Mg Tablet      5 MG PO QDAY, #30 TAB         Reported         9/8/16      Current Medications      Current Medications Reviewed 7/19/18            EXAM      CONSTITUTIONAL: Pleasant morbidly obese female with plethoric look in no acute     distress, normal conversant. She is very tearful  during our conversation.        EYES : Pink conjunctive, no ptosis, PERRL.       ENMT :Mallampati classification IV, no sinus tenderness.  Nose and ears appear     normal, normal dentition, mild posterior pharyngeal wall erythema.      Neck: Nontender, no masses, no thyromegaly, no nodules.      Resp : Bilateral diminished  breath sounds, resonant to percussion bilaterally,     no wheezing, crackles or rhonchi.      CVS  : No carotid bruits, s1s2 nl, RRR, no murmur, rubs or gallop, trace pedal     edema in bilateral lower extremities, nontender on palpation.       Chest wall: Increased AP diameter.  Normal rise with inspiration, nontender on     palpation      GI   : Abdomen soft, with no masses, no hepatosplenomegaly, no hernias, BS+      MSK  : Normal gait and station, no digital cyanosis or clubbing       Skin : No rashes, ulcerations or lesions, normal turgor and temperature      Neuro: CN II - XII intact, no sensory deficits, DTRs intact and symmetrical, no     motor weakness      Psych: Appropriate affect, A   Vtials      Vitals:             Height 62 in / 157.48 cm           Weight 213 lbs 0 oz / 96.254462 kg           BSA 2.11 m2           BMI 39.0 kg/m2           Temperature 98.0 F / 36.67 C - Oral            Pulse 100           Respirations 16           Blood Pressure 140/64 Sitting, Right Arm           Pulse Oximetry 94%, room air            REVIEW      Results Reviewed      PCCS Results Reviewed?:  Yes Prev Lab Results, Yes Prev Radiology Results, Yes     Previous Mecial Records      Lab Results      The patient's last arterial blood gases from 16 was reviewed which showed     a pH of 7.38, PCO2 60, PO2 of 72 on oxygen at 2 liters per minute.      Radiographic Results               [facility]                PACS RADIOLOGY REPORT            Patient: JESUS LAY   Acct: #[id number]   Report: #[id number]            UNIT #: [id number]    DOS: 18 0918      INSURANCE:[employer]   ORDER #:[id number]      LOCATION:[facility]     : 1960            PROVIDERS      ADMITTING:     ATTENDING: Reymundo Lomax      FAMILY:  NONE,MD   ORDERING:  Reymundo Lomax         OTHER:    DICTATING:  Patricio Taylor MD            REQ #:[id number]   [facility]      REASON FOR EXAM:        REASON FOR VISIT:  FORMER SMOKER            *******Signed******         PROCEDURE:   CT LOW DOSE CHEST SCREENING             COMPARISON:   None.             REASON FOR SCREENING:   Patient is 57 years of age, asymptomatic, and has a     smoking history of more       than 30 pack years.      SMOKING STATUS:   Former smoker.  Years since quitting:                 SCREENING VISIT:   Year 1.                   TECHNIQUE:   Axial unenhanced LDCT images from the apices through mid-kidney     were obtained.       Evaluation of solid organs and vascular structures is suboptimal due to the     lack of IV contrast.       Imaging was performed on a Siemens Emotion CT scanner.             RADIATION:   CT Dose Index Vol (CTDIvol):    3.11  mGy         Dose Length Product (DLP):    110  mGy-cm             DIAGNOSTIC QUALITY:   Satisfactory.                FINDINGS:      No well-defined consolidations or significant pleural effusions are observed.      Mild to moderate       bullous changes are seen throughout the lungs most pronounced in the lung     apices.  The findings       indicate mild to moderate emphysema.  No dominant pulmonary nodules or abnormal     pulmonary masses       are seen.              No significant hilar, mediastinal, or axillary lymphadenopathy is seen. A     normal aortic arch       branching pattern is noted.  Moderate atherosclerosis is noted.  Severe     coronary artery       calcifications are identified.  The thyroid gland is unremarkable. The     esophagus is unremarkable.             The limited evaluation of the upper abdomen demonstrates no definitive acute     abnormalities.  There       is a low-density focus involving the left adrenal gland.  This finding     demonstrates fat attenuation       and is consistent with an incidental adenoma.  The finding measures 2.5 cm.             No acute osseous abnormalities are seen.                CONCLUSION:         1. No evidence for acute intrathoracic abnormality.      2. Evidence for mild to moderate emphysema.      3. No dominant pulmonary nodules or masses are identified.      4. Evidence for severe coronary artery calcifications.      5. Note is made of an incidental left adrenal adenoma.             LUNG-RADS CLASSIFICATION:   Lung rads category 2, benign findings.      Intermittent yearly screening may       be considered.              MINISTERIO SOLIZ MD             Electronically Signed and Approved By: MINISTERIO SOLIZ MD on 3/07/2018 at 10:50                            Until signed, this is an unconfirmed preliminary report that may contain      errors and is subject to change.                                              MARQUISDA:      D:03/07/18 1050      PFT Results      [id number]  [id number]                                       [facility]                           [employer]                            [place]  [id number]               __________________________________________________________________________             Patient Name:                   Attending Physician:      Hamida Laura M.D.             Patient Visit # MR #            Admit Date  Disch Date     Location      [id number]    [id number]      09/29/2016                 [facility] -             Date of Birth      1960      __________________________________________________________________________      821 - DIAGNOSTIC REPORT             PULMONARY FUNCTION TEST             DATE OF SERVICE:      9/29/2016             PRIMARY CARE PHYSICIAN:      Leno Hidalgo M.D.             SPIROMETRY:      Spirometry shows severe obstructive process.      FEV1/FVC ratio is 58%.      FEV1 is 1.07 L, 38% of predicted.      FVC is 1.85 L, 52% of predicted.      Bronchodilator response:  There is a significant response to bronchodilator      administration. FEV increased from 1.07 L to 1.30 L, 22% increase.  FVC      increased from 1.85 L to 2.29 L, 24% change.             LUNG VOLUMES:      Lung volumes show air trapping.      Total lung capacity is 5.19 L, 99% of predicted.      Residual volume is 3.13 L, 161% of predicted.             DIFFUSION:      Diffusion capacity is moderately decreased at 58% of predicted.             FLOW VOLUME LOOP:      Flow volume loop is compatible with obstructive process.             CONCLUSION:        1. Low FEV1/FVC with low FEV1 and FVC with air trapping and low diffusion           capacities, suggestive of severe obstructive airway disease, likely           emphysema.        2. There is some reversible component to her obstructive airway disease.        3. Please correlate clinically.             To be electronically signed in [facility]      [id number] MAURO TOM M.D.             NK:rupa      D:  10/04/2016  16:23      T:  10/04/2016 19:16      #[id number]             Until signed, this is an unconfirmed preliminary report that may contain      errors and is subject to change.                   10/06/16 140  <Electronically signed by Reymundo Lomax MD>            Assessment      Notes      New Medications      * Lisinopril* 5 MG TABLET: 5 MG PO BID #60      * Budesonide/Formoterol Fumarate (Symbicort 160/4.5 Mcg) 10.2 GM INH: 2 PUFF     INH BID #1      * UMECLIDINIUM BROMIDE (Incruse Ellipta) 62.5 MCG BLST.W.DEV: 1 PUFF INH RTQDAY     #1      PLAN:      The patient is a 58 year old female with very severe chronic obstructive     pulmonary disease, chronic smoking history, history of  obesity     hyperventilation syndrome and mild obstructive sleep apnea.             1. Chronic obstructive pulmonary disease. FEV1 1.07 liters, 30% predicted.     Continue with Symbicort two puffs twice daily and Incruse daily and albuterol     as needed.  She needs to quit smoking.              2. Smoking cessation. Counseling was done for more than 5 minutes. She has     nicotine patch and gum at home. I advised her to continue with nicotine patch     daily and use gum whenever she has cravings. She says she has a lot of stress     at home and is not able to use it now.             3. Obesity hyperventilation syndrome and obstructive sleep apnea. Continue with     BiPAP 20/15. She has good adherence with it. She gets her supplies through     [facility].             4. Low dose lung cancer screening CT scan of the chest was reviewed with her.     She will need lung cancer screening CT scan every year.             5. I will follow up with her in 6 months, earlier if needed.            Patient Education      Education resources provided:  Yes      Patient Education Provided:  Acute Bronchitis, COPD                 Disclaimer: Converted document may not contain table formatting or lab diagrams. Please see [employer] for  the authenticated document.

## 2021-05-28 NOTE — PROGRESS NOTES
Patient: HAMIDA LAY     Acct: [id number]     Report: #[id number]  UNIT #: [id number]     : 1960    Encounter Date:2020  PRIMARY CARE: SONIA PETIT  ***Signed***  --------------------------------------------------------------------------------------------------------------------  TELEHEALTH NOTE      History of Present Illness            Chief Complaint: (soa follow up )            Hamida Lay is presenting for evaluation via Telehealth visit by phone.     Verbal consent obtained before beginning visit.            Provider spent (23) minutes with the patient during telehealth visit.            The following staff were present during the visit: (Reymundo Lomax MD and Petr joe ma)            The patient is a 60 year old female with a history of severe COPD, ongoing     tobacco abuse, obesity hypoventilation syndrome on obstructive sleep apnea on     BiPAP and morbid obesity.  This is a TeleHealth visit.  She is currently taking     Advair 500/50 one puff twice a day, Incruse once daily and albuterol as needed     which she uses everyday or so.  She was prescribed nicotine patch, but says that    it causes her severe gastritis.  She continues to use BiPAP regularly. She is     currently smoking up to ten cigarettes a day. She has no fever, chills, nausea,     vomiting, chest pain or chest tightness.  She has not needed any antibiotics or     steroids since her last office visit.  She is suppose to have CT scan of the     chest early in . She is kind of scared to go for the CT scan, but is willing    to go for it.            There is no exam performed as this is a TeleHealth telephone visit.                       [facility]                PACS RADIOLOGY REPORT            Patient: HAMIDA LAY   Acct: #[id number]   Report: #[id number]            UNIT #: [id number]    DOS: 20 1528       INSURANCE:[employer]   ORDER #:[id number]      LOCATION:[facility]     : 1960            PROVIDERS      ADMITTING:     ATTENDING: Reymundo Lomax      FAMILY:  SONIA PETIT   ORDERING:  Reymundo Lomax         OTHER:    DICTATING:  ROLF MARINO MD            REQ #:[id number]   EXAM:[facility] - LOW DOSE CHEST CT SCREENING      REASON FOR EXAM:  CURRENT      REASON FOR VISIT:  CURRENT            *******Signed******         PROCEDURE:   CT LOW DOSE CHEST SCREENING             COMPARISON:   [facility], CT, CT LOW DOSE CHEST SCREENING,     2019, 13:12.             REASON FOR SCREENING:   Patient is 60 years of age, asymptomatic, and has a     smoking history of more       than 30 pack years.      SMOKING STATUS:   Current smoker.                  SCREENING VISIT:   <>                  TECHNIQUE:   Axial unenhanced LDCT images from the apices through mid-kidney     were obtained.       Evaluation of solid organs and vascular structures is suboptimal due to the lack    of IV contrast.       Imaging was performed on a Siemens Emotion 16 CT scanner.             RADIATION:   CT Dose Index Vol (CTDIvol):    3.11  mGy         Dose Length Product (DLP):    112  mGy-cm             DIAGNOSTIC QUALITY:   Satisfactory             FINDINGS:         1-2 mm nodule superior segment right lower lobe (image 56) is stable.      Centrilobular emphysema.  No       new pulmonary nodules.  No adenopathy in the chest.  No definite acute findings     are seen in the       included upper abdomen.  Left adrenal adenoma is similar.  1.9 cm cyst in the     upper pole of the       left kidney.  No aggressive appearing bone change.             CONCLUSION:   Lung rads category 2-benign.  Per the ACR lung rads recommen    dations, suggest the       patient continue with yearly low-dose lung cancer screening.              ROLF MARINO MD             Electronically Signed and Approved By: ROLF MARINO MD on  6/02/2020 at 16:21                        Until signed, this is an unconfirmed preliminary report that may contain      errors and is subject to change.                                              DOWER:      D:06/02/20 1621                         Past Med History      smoking status:  Current every day smoker ( 1 ppd for 40 years )      Overview of Symptoms      fatigue            Allergies/Medications      Allergies:        Coded Allergies:             NO KNOWN ALLERGIES (Unverified , 9/5/19)      Medications    Last Reconciled on 5/19/20 11:54 by REYMUNDO LOMAX MD      Umeclidinium Bromide (Incruse Ellipta) 62.5 Mcg Blst.w.dev      1 PUFF INH RTQDAY, #1 MDI 3 Refills         Prov: Erica Bansal PA-C         3/31/20       MDI-Advair 500/50 (Advair 500/50 Diskus) 1 Each Blst.w.dev      1 PUFF INH RTBID, #1 INH 5 Refills         Prov: Erica Bansal PA-C         9/5/19       Albuterol Sulfate (Albuterol Sulfate) 1.25 Mg/3 Ml Vial.neb      1.25 MG INH Q4-6H PRN for SHORTNESS OF BREATH, #120 NEB         Prov: Erica Bansal PA-C         9/5/19       Lisinopril* (Lisinopril*) 5 Mg Tablet      5 MG PO QDAY, #60 TAB 0 Refills         Reported         7/19/18       metFORMIN HCl (metFORMIN HCl) 850 Mg Tablet      850 MG PO QDAY, #30 TAB 0 Refills         Reported         1/20/18       Levothyroxine (Levothyroxine) 0.15 Mg Tablet      0.125 MG PO QDAY@07, #30 TAB 0 Refills         Reported         1/20/18       MDI-Albuterol (Ventolin HFA) 8 Gm Hfa.aer.ad      2 PUFFS INH Q4-6H PRN for DYSPNEA, #1 INH 6 Refills         Prov: Reymundo Lomax         8/4/17       ALPRAZolam (ALPRAZolam) 0.5 Mg Tablet      0.5 MG PO TID PRN for ANXIETY, #90 TAB         Reported         9/8/16       Simvastatin (Simvastatin*) 20 Mg Tablet      20 MG PO HS, #30 TAB 0 Refills         Reported         9/8/16       amLODIPine (amLODIPine) 5 Mg Tablet      5 MG PO QDAY, #30 TAB         Reported         9/8/16            Plan/Instructions       Ambulatory Assessment/Plan:        Notes      Changed Medications      * Levothyroxine 0.15 MG TABLET: 0.125 MG PO QDAY@07 #30      Discontinued Medications      * Nicotine Polacrilex (Nicotine) 2 MG LOZENGE: 2 MG BUCCAL Q4-6H #180      * Nicotine Polacrilex (Nicorette) 2 MG GUM: 1 MG PO Q2-3H 30 Days #120         Instructions: to replace lozenges      Plan/Instructions            * Plan Of Care: ()            * Chronic conditions reviewed and taken into consideration for today's treatment       plan.      * Patient instructed to seek medical attention urgently for new or worsening       symptoms.      * Patient was educated/instructed on their diagnosis, treatment and medications       prior to discharge from the clinic today.            PLAN:  The patient is a 60 year old female with severe COPD, chronic ongoing     tobacco abuse with cigarettes, obesity hypoventilation syndrome and obstructive     sleep apnea on nightly BiPAP and morbid obesity.        1. Severe COPD. Continue with Advair 500/50 one puff twice a day.  Incruse one     puff daily.      2. Continue with albuterol as needed.        3. She needs to quit smoking. She is currently smoking up to one half pack per     day.  Smoking cessation: Counseling was done for more than five minutes.  She     says nicotine replacement therapy causes her to have acid reflux and she is not     willing to work on it. She wants to go down on cigarettes on her own.      4. Obstructive sleep apnea and obesity hypoventilation syndrome.      5. Continue with nightly BiPAP. She is adherent with it. She has repeat CT scan     of the chest scheduled in June, I will follow up with her after repeat CT scan.     If something abnormal is seen, I will follow up earlier.      6. I offered refills on medications, she is currently stable with her     medications.        7. Follow up in three months, earlier if needed.      Codes:  Phone Eval 21-30 mi [id number]            Electronically  signed by Reymundo Lomax  06/06/2020 15:43       Disclaimer: Converted document may not contain table formatting or lab diagrams. Please see [employer] for the authenticated document.

## 2021-05-28 NOTE — PROGRESS NOTES
Patient: HAMIDA LAY     Acct: [id number]     Report: #[id number]  UNIT #: [id number]     : 1960    Encounter Date:2021  PRIMARY CARE: SONIA PETIT  ***Signed***  --------------------------------------------------------------------------------------------------------------------  History of Present Illness      Chief Complaint: 4-5M F/U - Resp, Failure, CECILIA. COPD, Hypertension            Hamida Lay is presenting for evaluation via Telehealth visit. Verbal     consent obtained before beginning visit.            PAST MEDICAL HISTORY/OVERVIEW OF PATIENT SYMPTOMS            Symptoms: None            Any known Exposure to COVID-19: No             Current everyday smoker (1/2-1ppd)             Flu: UTD            Pneum: Not sure             Covid: No             Provider spent 9 minutes with the patient during telehealth visit.            The following staff were present during this visit: Nedra Castro MA            The patient is a 61 year old female patient of Dr. Lomax's with history of     chronic obstructive pulmonary disease, chronic hypoxic and hypercapneic re    spiratory failure, obstructive sleep apnea and obesity hypoventilation syndrome     on BiPAP.  The patient presents for Telehealth visit today. The patient states     since her last office visit her breathing is doing well. The patient states she     is taking advair and Incruse everyday as prescribed and uses albuterol inhaler     and nebulizer treatments as needed. The patient states she has not had to take     any antibiotics or steroids and denies any hospitalizations since her last     office visit. The patient denies any cough, wheezing,  fever or chills, night     sweats, hemoptysis,  purulent sputum production, swollen glands in head and     neck, unintentional weight loss, chest pain or chest tightness, abdominal pain,     nausea or vomiting or diarrhea. The patient denies  any  headaches, myalgias,     sore throat, changes in sense of taste and smell any coronavirus or flu like     symptoms. The patient denies any leg swelling, orthopnea or paroxysmal nocturnal    dyspnea. The patient states she is able to perform her activities of daily     living without difficulty. The patient states she is using her BiPAP machine     every night which helps her to sleep. The patient denies any morning headaches     or excessive daytime sleepiness. The patient states she is already scheduled to     have a repeat low dose CT scan of the chest on 06/03/21 in [place].             I reviewed the Review of Systems, medical, surgical and family history and agree    with those as entered.               Physical exam is deferred due to Telehealth visit.            I personally reviewed Dr. Lomax's last Telehealth visit note.                             Allergies and Medications      Allergies:        Coded Allergies:             NO KNOWN ALLERGIES (Unverified , 5/13/21)      Medications    Last Reconciled on 5/13/21 13:17 by STEFANIE WETZEL,       Amitriptyline HCl (Elavil) 10 Mg Tab      10 MG PO BID, TAB         Reported         5/13/21       Cyanocobalamin (Vitamin B-12*) 500 Mcg Tab      500 MCG PO QDAY, #30 TAB         Reported         5/13/21       Umeclidinium Bromide (Incruse Ellipta) 62.5 Mcg Blst.w.dev      1 PUFF INH RTQDAY for 30 Days, #1 MDI 8 Refills         Prov: Reymundo Lomax         3/1/21       MDI-Advair 500/50 (Advair 500/50 Diskus) 1 Each Blst.w.dev      1 PUFF INH RTBID, #1 INH 5 Refills         Prov: LISETH RODRIGUEZ PA-C         9/5/19       Albuterol Sulfate (Albuterol Sulfate) 1.25 Mg/3 Ml Vial.neb      1.25 MG INH Q4-6H PRN for SHORTNESS OF BREATH, #120 NEB         Prov: LISETH RODRIGUEZ PA-C         9/5/19       Lisinopril* (Lisinopril*) 5 Mg Tablet      5 MG PO QDAY, #60 TAB 0 Refills         Reported         7/19/18       metFORMIN HCl (metFORMIN HCl) 850 Mg Tablet       850 MG PO QDAY, #30 TAB 0 Refills         Reported         1/20/18       Levothyroxine (Levothyroxine) 0.15 Mg Tablet      0.125 MG PO QDAY@07, #30 TAB 0 Refills         Reported         1/20/18       MDI-Albuterol (Ventolin HFA) 8 Gm Hfa.aer.ad      2 PUFFS INH Q4-6H PRN for DYSPNEA, #1 INH 6 Refills         Prov: Reymundo Lomax         8/4/17       ALPRAZolam (ALPRAZolam) 0.5 Mg Tablet      0.5 MG PO TID PRN for ANXIETY, #90 TAB         Reported         9/8/16       Simvastatin (Simvastatin*) 20 Mg Tablet      20 MG PO HS, #30 TAB 0 Refills         Reported         9/8/16       amLODIPine (amLODIPine) 5 Mg Tablet      5 MG PO QDAY, #30 TAB         Reported         9/8/16            Plan      Orders:  Phone Eval 5-10 min [id number]      Instructions      * Chronic conditions reviewed and taken in consideration for today's treatment       plan.      * Plan Of Care: ()      * Patient instructed to seek medical attention urgently for new or worsening       symptoms.      * Patient was educated/instructed on their diagnosis, treatment and medications       today.      * Recommend self monitoring. Instructions given.      * Recommend self quarantine for 14 days.      * Recommend self quarantine until without fever for 72 hours without using fever       reducing medications.      * Recommends over the counter medications for symptom management.            ASSESSMENT:      1. Chronic hypoxic respiratory failure.       2. Chronic obstructive pulmonary disease on triple inhaler therapy.       3. Obesity hypoventilation syndrome.       4. Obstructive sleep apnea on nightly BiPAP.       5. Tobacco abuse of cigarettes ongoing, the patient reports she is smoking half     to 1 pack per day.             PLAN:      1. Continue advair and Incruse everyday as prescribed and rinse her mouth after     each use.       2. Continue albuterol inhaler and nebulizer treatment as needed.       3. I spent 3 minutes today counseling the patient on  smoking cessation.  I     counseled the patient on the risks of continued smoking including the risk of     lung cancer, head and neck cancer, renal cancer, heart disease, stroke, and     early death. The patient refuses nicotine replacement therapy and  pharm    acotherapy at this time.  The patient is advised to decrease the amount of     cigarettes to the point where she can quit.      4. For chronic hypoxic and hypercapneic respiratory failure and obstructive     sleep apnea, continue BiPAP with sleep and naps. I will request a copy of the     patient's BiPAP compliance report and notify the patient if any changes need to     be made. Continue BiPAP at current settings and clean mask and tubing daily.       5. The patient is advised to call the office, call 911 or go to the ER for any     new or worsening symptoms.       6. The patient reports she is up to date with flu vaccine, she will check on the     status of her pneumonia vaccine and notify our office. The patient is advised     to receive the COVID-19 vaccine when available.  The patient is advised to     follow CDC recommendations such as social distancing, wearing a mask and washing     hand for at least 20 seconds.      7. Follow up with Dr. Lomax in 4-6 months, sooner if needed.            Electronically signed by STEFANIE WETZEL [facility]  05/18/2021 16:00       Disclaimer: Converted document may not contain table formatting or lab diagrams. Please see [employer] for the authenticated document.

## 2021-05-28 NOTE — PROGRESS NOTES
Patient: JESUS LAY     Acct: [id number]     Report: #[id number]  UNIT #: [id number]     : 1960    Encounter Date:01/10/2018  PRIMARY CARE: SONIA PETIT  ***Signed***  --------------------------------------------------------------------------------------------------------------------  Chief Complaint      Encounter Date      Casey 10, 2018            Referring Provider      SONIA PETIT            Patient Complaint      Patient is complaining of      5 month follow up            VITALS      Height 5 ft 2 in / 157.48 cm      Weight 214 lbs 2 oz / 97.974053 kg      BSA 2.11 m2      BMI 39.2 kg/m2      Temperature 98.8 F / 37.11 C - Oral      Pulse 108      Respirations 14      Blood Pressure 192/87 Sitting, Right Arm      Pulse Oximetry 91%, room air      Exhaled Nitrous Oxide Testin            HPI      The patient is a 57 year old female with  very severe chronic obstructive     pulmonary disease, chronic smoking history, obesity hypoventilation syndrome     and mild obstructive sleep apnea. She is here for follow up.             Since her last office visit she continues to use Trilogy 20/15 with very good     adherence. I reviewed the Trilogy usage data today. She brought her Trilogy     usage data from 10/11/17 to 18.  The average AHI was 0.6 with CPAP     pressure of 59, IPAP pressure of 20 and average daily usage of 11 hours and 42     minutes. She continues to take Symbicort twice daily. Her Spiriva was switched     to Incruse. She feels like the Symbicort and Incruse helps. She is on Ventolin     but she rarely uses it. She has gone back to smoking cigarettes. She is     currently smoking 5 cigarettes a day. She has lozenges but does not use     nicotine patches. She uses a full face mask with her CPAP machine. She has no     nausea and vomiting, no weight loss, no loss of appetite and no coughing up     blood.            ROS      Constitutional:  Denies: Fatigue, Fever,  Weight gain, Weight loss, Chills,     Insomnia, Other      Respiratory/Breathing:  Complains of: Cough, Denies: Shortness of air, Wheezing    , Hemoptysis, Pleuritic pain, Other      Endocrine:  Denies: Polydipsia, Polyuria, Heat/cold intolerance, Abnorml     menstrual pattern, Diabetes, Other      Eyes:  Denies: Blurred vision, Vision Changes, Other      Ears, nose, mouth, throat:  Complains of: Nasal or Sinus Pain, Denies: Mouth     lesions, Thrush, Throat pain, Hoarseness, Allergies/Hay Fever, Post Nasal Drip,     Headaches, Recent Head Injury, Nose Bleeding, Neck Stiffness, Thyroid Mass,     Hearing Loss, Ear Fullness, Dry Mouth, Dry Lips, Nasal discharge, Nasal     congestion, Other      Cardiovascular:  Denies: Palpitations, Syncope, Claudication, Chest Pain, Wake     up Gasping for air, Leg Swelling, Irregular Heart Rate, Cyanosis, Dyspnea on     Exertion, Other      Gastrointestinal:  Denies: Nausea, Constipation, Diarrhea, Abdominal pain,     Vomiting, Difficulty Swallowing, Reflux/Heartburn, Dysphagia, Jaundice, Bloating    , Melena, Bloody stools, Other      Genitourinary:  Denies: Urinary frequency, Incontinence, Hematuria, Urgency,     Nocturia, Dysuria, Testicular problems, Other      Musculoskeletal:  Denies: Joint Pain, Joint Stiffness, Joint Swelling, Myalgias    , Other      Hematologic/lymphatic:  DENIES: Lymphadenopathy, Bruising, Bleeding tendencies,     Other      Neurological:  Denies: Headache, Numbness, Weakness, Seizures, Other      Psychiatric:  Denies: Anxiety, Appropriate Effect, Depression, Other      Sleep:  No: Excessive daytime sleep, Morning Headache?, Snoring, Insomnia?,     Stop breathing at sleep?, Other      Integumentary:  Denies: Rash, Dry skin, Skin Warm to Touch, Other      Immunologic/Allergic:  Denies: Latex allergy, Seasonal allergies, Asthma,     Urticaria, Eczema, Other      Immunization status:  No: Up to date            FAMILY/SOCIAL/MEDICAL HX      Surgical History:   Yes: Abdominal Surgery, Oral Surgery (TEETH EXTRACTION), No:     AAA Repair, Angioplasty, Appendectomy, Back Surgery, Bladder Surgery, Bowel     Surgery, Breast Surgery, CABG, Carotid Stenosis, Cholecystectomy, Ear Surgery,     Eye Surgery, Head Surgery, Hernia Surgery, Kidney Surgery, Nose Surgery,     Orthopedic Surgery, Prostatectomy, Rectal Surgery, Spinal Surgery, Testicular     Surgery, Throat Surgery, Valve Replacement, Vascular Surgery, Other Surgeries      Stroke - Family Hx:  Brother      Cancer/Type - Family Hx:  Mother      Is Father Still Living?:  No      Is Mother Still Living?:  No      Social History:  No Tobacco Use, No Alcohol Use, No Recreational Drug use      Smoking status:  Former smoker (sept 2016 1/2 to 1 ppd )      Anticoagulation Therapy:  No      Antibiotic Prophylaxis:  No      Medical History:  Yes: Chronic Bronchitis/COPD, High Blood Pressure, Thyroid     Problem, No: Alcoholism, Allergies, Anemia, Arthritis, Asthma, Blood Disease,     Broken Bones, Cataracts, Chemical Dependency, Chemotherapy/Cancer, Emphysema,     Chronic Liver Disease, Colon Trouble, Colitis, Diverticulitis, Congestive Heart     Failu, Deafness or Ringing Ears, Convulsions, Depression, Anxiety, Bipolar     Disorder, Diabetes, Epilepsy, Seizures, Forgetfullness, Glaucoma, Gall Stones,     Gout, Head Injury, Heart Attack, Heart Murmur, Hemorrhoids/Rectal Prob,     Hepatitis, Hiatal Hernia, High Cholesterol, HIV (Do not ask - volu, Jaundice,     Kidney or Bladder Disease, Kidney Stones, Migrane Headaches, Mitral Valve     Prolapse, Night sweats, Phlebitis, Psychiatric Care, Reflux Disease, Rheumatic     Fever, Sexually Transmitted Dis, Shortness Of Breath, Sinus Trouble, Skin     Disease/Psoriais/Ecz, Stroke, Tuberculosis or Pos TB Te, Miscellaneous Medical/    oth            Hx Influenza Vaccination:  No      Date Influenza Vaccine Given:  Sep 19, 2016      Influenza Vaccine Declined:  Yes      2 or More Falls Past  Year?:  No      Fall Past Year with Injury?:  No      Hx Pneumococcal Vaccination:  No      Encouraged to follow-up with:  PCP regarding preventative exams.      Chart initiated by      gilda joe ma            ALLERGIES/MEDICATIONS      Allergies:        Coded Allergies:             NO KNOWN ALLERGIES (Unverified , 1/10/18)      Medications    Last Reconciled on 1/10/18 17:19 by REYMUNDO LOMAX MD      Nicotine 21 Mg Patch (Nicoderm 21 Mg Patch) 1 Each Patch.td24      21 MG TRANSDERM QDAY for 30 Days, #30 PATCH 3 Refills         Prov: Reymundo Lomax         1/10/18       Umeclidinium Bromide (Incruse Ellipta) 62.5 Mcg Blst.w.dev      1 PUFF INH RTQDAY, #1 MDI 3 Refills         Prov: Reymundo Lomax         11/20/17       MDI-Albuterol (Ventolin HFA*) 8 Gm Hfa.aer.ad      2 PUFFS INH Q4-6H Y for DYSPNEA, #1 INH 6 Refills         Prov: Reymundo Lomax         8/4/17       Budesonide/Formoterol Fumarate (Symbicort 160/4.5 Mcg) 10.2 Gm Inh      2 PUFF INH BID, #1 INH 9 Refills         Prov: Reymundo Lomax         8/4/17       Metformin Hcl (Metformin Hcl*) 500 Mg Tablet      1000 MG PO BID, #120 TAB 0 Refills         Reported         8/4/17       Budesonide/Formoterol Fumarate (Symbicort 160/4.5 Mcg) 10.2 Gm Inh      2 PUFF INH RTBID, #1 INH 6 Refills         Prov: Reymundo Lomax         9/16/16       Albuterol Sulfate (Albuterol Sulfate) 1.25 Mg/3 Ml Vial.neb      1.25 MG INH Q4-6H Y for SHORTNESS OF BREATH, #120 NEB         Prov: LIBERTAD CARMEN         9/10/16       MDI-Albuterol (Ventolin HFA*) 18 Gm Hfa.aer.ad      2 PUFFS INH Q4H Y for SHORTNESS OF BREATH, #1 INH 0 Refills         Reported         9/8/16       Budesonide/Formoterol Fumarate (Symbicort 160/4.5 Mcg) 10.2 Gm Inh      2 PUFF INH RTBID, #1 INH 0 Refills         Reported         9/8/16       Alprazolam (Alprazolam) 0.5 Mg Tablet      0.5 MG PO TID Y for ANXIETY, #90 TAB         Reported         9/8/16       Levothyroxine (Levothyroxine) 0.137 Mg Tablet      0.137 MG  PO QDAY@07, #30 TAB 0 Refills         Reported         9/8/16       Simvastatin (Simvastatin*) 20 Mg Tablet      20 MG PO HS, #30 TAB 0 Refills         Reported         9/8/16       amLODIPine (amLODIPine) 5 Mg Tablet      5 MG PO QDAY, #30 TAB         Reported         9/8/16      Current Medications      Current Medications Reviewed 1/10/18            EXAM      CONSTITUTIONAL: Pleasant morbidly obese female with plethoric look in no acute     distress, normal conversant.       EYES : Pink conjunctive, no ptosis, PERRL.       ENMT :Mallampati classification IV, no sinus tenderness.  Nose and ears appear     normal, normal dentition, mild posterior pharyngeal wall erythema.      Neck: Nontender, no masses, no thyromegaly, no nodules.      Resp : Bilateral diminished  breath sounds, resonant to percussion bilaterally,     no wheezing, crackles or rhonchi.      CVS  : No carotid bruits, s1s2 nl, RRR, no murmur, rubs or gallop, trace pedal     edema in bilateral lower extremities, nontender on palpation.       Chest wall: Increased AP diameter.  Normal rise with inspiration, nontender on     palpation      GI   : Abdomen soft, with no masses, no hepatosplenomegaly, no hernias, BS+      MSK  : Normal gait and station, no digital cyanosis or clubbing       Skin : No rashes, ulcerations or lesions, normal turgor and temperature      Neuro: CN II - XII intact, no sensory deficits, DTRs intact and symmetrical, no     motor weakness      Psych: Appropriate affect, A   Vtials      Vitals:             Height 5 ft 2 in / 157.48 cm           Weight 214 lbs 2 oz / 97.839222 kg           BSA 2.11 m2           BMI 39.2 kg/m2           Temperature 98.8 F / 37.11 C - Oral           Pulse 108           Respirations 14           Blood Pressure 192/87 Sitting, Right Arm           Pulse Oximetry 91%, room air            REVIEW      Results Reviewed      PCCS Results Reviewed?:  Yes Prev Lab Results, Yes Prev Radiology Results, Yes      [facility]      Lab Results      The patient's CPAP usage data was reviewed. The patient is on BiPAP 20/15 with     average AHI of 0.6 with good adherence to her CPAP machine.      Radiographic Results                     [facility]                PACS RADIOLOGY REPORT            Patient: JESUS LAY   Acct: #[id number]   Report: #[id number]            UNIT #: [id number]    DOS: 16 1426      INSURANCE:SELF PAY INSURANCE   ORDER #:[id number]      LOCATION:[facility]  102   : 1960            PROVIDERS      ADMITTING:  Kamron Diggs   FAMILY:  NONE,MD         ORDERING:  Reymundo Lomax   OTHER:       DICTATING:  Ministerio Camara MD            REQ #:[id number]    CPT CODE:65175   EXAM:CXR1 - CHEST 1 View AP PA      REASON FOR EXAM:  COPD/ ASSPIRATION      REASON FOR VISIT:  RESPIRATORY FAILURE            *******Signed******               PROCEDURE:   CHEST AP/PA SINGLE VIEW             COMPARISON:   None.             INDICATIONS:   COPD/ ASSPIRATION/ RESP DISTRESS             FINDINGS:         Mild linear atelectasis is present within the bilateral lower lobes. No focal     airspace       consolidation is identified. The heart and mediastinal contours appear normal.     There is pulmonary       vascular congestion.             CONCLUSION:         1. Mild linear atelectasis within bilateral lower lobes.      2. Pulmonary vascular congestion.              MINISTERIO CAMARA MD             Electronically Signed and Approved By: MINISTERIO CAMARA MD on 2016 at 14:    56                        Until signed, this is an unconfirmed preliminary report that may contain      errors and is subject to change.                                              RODD1:      D:16 1456      PFT Results      [id number]  [id number]                                       [facility]                          Health Information  Management Services                            [place]  [phone]               __________________________________________________________________________             Patient Name:                   Attending Physician:      Hamida Laura M.D.             Patient Visit # MR #            Admit Date  Disch Date     Location      [id number]    [id number]      09/29/2016                 [facility]- -             Date of Birth      1960      __________________________________________________________________________      821 - DIAGNOSTIC REPORT             PULMONARY FUNCTION TEST             DATE OF SERVICE:      9/29/2016             PRIMARY CARE PHYSICIAN:      Leno Hidalgo M.D.             SPIROMETRY:      Spirometry shows severe obstructive process.      FEV1/FVC ratio is 58%.      FEV1 is 1.07 L, 38% of predicted.      FVC is 1.85 L, 52% of predicted.      Bronchodilator response:  There is a significant response to bronchodilator      administration. FEV increased from 1.07 L to 1.30 L, 22% increase.  FVC      increased from 1.85 L to 2.29 L, 24% change.             LUNG VOLUMES:      Lung volumes show air trapping.      Total lung capacity is 5.19 L, 99% of predicted.      Residual volume is 3.13 L, 161% of predicted.             DIFFUSION:      Diffusion capacity is moderately decreased at 58% of predicted.             FLOW VOLUME LOOP:      Flow volume loop is compatible with obstructive process.             CONCLUSION:        1. Low FEV1/FVC with low FEV1 and FVC with air trapping and low diffusion           capacities, suggestive of severe obstructive airway disease, likely           emphysema.        2. There is some reversible component to her obstructive airway disease.        3. Please correlate clinically.             To be electronically signed in [facility]      [id number] MAURO TOM M.D.             NK:rupa      D:  10/04/2016 16:23      T:  10/04/2016 19:16       #[id number]             Until signed, this is an unconfirmed preliminary report that may contain      errors and is subject to change.                   10/06/16 1404  <Electronically signed by Reymundo Lomax MD>            PLAN      Assessment      Smoking greater than 40 pack years - F17.210            Notes      New Medications      * Nicotine 21 Mg Patch (Nicoderm 21 Mg Patch) 1 EACH PATCH.TD24: 21 MG     TRANSDERM QDAY 30 Days #30      Discontinued Medications      * TIOTROPIUM BROMIDE (Spiriva Respimat 2.5 mcg/Puff) 4 GM MIST.INHAL: 2 PUFFS     INH RTQDAY #1      * TIOTROPIUM BROMIDE (Spiriva Respimat 2.5 mcg/Puff) 4 GM MIST.INHAL: 2 PUFFS     INH RTQDAY #1      New Diagnostics      * Low Dose Chest CT, Week       Dx: Smoking greater than 40 pack years - F17.210      PLAN:      The patient is a 57 year old female with very severe chronic obstructive     pulmonary disease, chronic smoking history,obesity hyperventilation syndrome     and mild obstructive sleep apnea.             1. Chronic obstructive pulmonary disease. FEV1 1.07 liters, 30% predicted.     Continue with Symbicort and Incruse.  Continue with albuterol as needed. She     needs to quit smoking.              2. Smoking cessation. Counseling was done for more than 5 minutes.  The patient     is willing to try nicotine patches again. I have prescribed nicotine patch and     lozenges.             3. Obesity hyperventilation syndrome and obstructive sleep apnea. Continue with     BiPAP 20/15. She has good adherence with it.  Her CPAP usage data was reviewed     today.            4. I ordered low dose lung cancer screening CT scan of the chest after having a     lengthy discussion regarding lung cancer screening CT scan. Shared decision     making was done to order the low dose CT scan of the chest.              5. I will follow up with her in 3 months, earlier if needed.  She will have her     low dose CT scan of the chest when she comes back for her  follow up.            Patient Education      Education resources provided:  Yes      Patient Education Provided:  COPD, Sarcoidosis                 Disclaimer: Converted document may not contain table formatting or lab diagrams. Please see [employer] for the authenticated document.

## 2021-05-28 NOTE — PROGRESS NOTES
Patient: HAMIDA LAY     Acct: [id number]     Report: #[id number]  UNIT #: [id number]     : 1960    Encounter Date:2020  PRIMARY CARE: SONIA PETIT  ***Signed***  --------------------------------------------------------------------------------------------------------------------  History of Present Illness      Chief Complaint: (2-3 month f/u,soa )            Hamida Lay is presenting for evaluation via Telehealth visit. Verbal     consent obtained before beginning visit.            PAST MEDICAL HISTORY/OVERVIEW OF PATIENT SYMPTOMS            Any known Exposure to COVID-19:no            Provider spent 12 minutes with the patient during telehealth visit.            The following staff were present during this visit: (steve ernst md and gilda joe ma )            The patient is a 60 year old female with chronic obstructive pulmonary disease,     chronic hypoxic and hypercapneic respiratory failure, obstructive sleep apnea     and obesity hypoventilation syndrome  who presents for Telehealth visit today.     Overall she is doing okay. She is currently on advair 500/50, incruse once daily    and albuterol as needed and hardly needs to use her rescue inhaler over the last    1-2 months. Since the pandemic she has not gone out as much. She continues to     use BiPAP with sleep. During the daytime when she takes naps she does not use     BiPAP. She has no fever or chills, no nausea or vomiting, no chest pain or chest    tightness. She continues to smoke half to 1 pack per day. She has nicotine     patches and gums at home but she does not use them much. Overall she has no     significant changes in weight or appetite.             Physical exam is deferred due to Telehealth visit.            [id number]  [id number]                                       [facility]                          Health Information Management Services                             [place]  [id number]               __________________________________________________________________________             Patient Name:                   Attending Physician:      Hamida Laura M.D.             Patient Visit # MR #            Admit Date  Disch Date     Location      [id number]    [id number]      09/29/2016                 [facility]             Date of Birth      1960      __________________________________________________________________________      821 - DIAGNOSTIC REPORT             PULMONARY FUNCTION TEST             DATE OF SERVICE:      9/29/2016             PRIMARY CARE PHYSICIAN:      Leno Hidalgo M.D.             SPIROMETRY:      Spirometry shows severe obstructive process.      FEV1/FVC ratio is 58%.      FEV1 is 1.07 L, 38% of predicted.      FVC is 1.85 L, 52% of predicted.      Bronchodilator response:  There is a significant response to bronchodilator      administration. FEV increased from 1.07 L to 1.30 L, 22% increase.  FVC      increased from 1.85 L to 2.29 L, 24% change.             LUNG VOLUMES:      Lung volumes show air trapping.      Total lung capacity is 5.19 L, 99% of predicted.      Residual volume is 3.13 L, 161% of predicted.             DIFFUSION:      Diffusion capacity is moderately decreased at 58% of predicted.             FLOW VOLUME LOOP:      Flow volume loop is compatible with obstructive process.             CONCLUSION:        1. Low FEV1/FVC with low FEV1 and FVC with air trapping and low diffusion           capacities, suggestive of severe obstructive airway disease, likely           emphysema.        2. There is some reversible component to her obstructive airway disease.        3. Please correlate clinically.             To be electronically signed in "[employer]      [id number] MAURO TOM M.D.             NK:rupa      D:  10/04/2016 16:23      T:  10/04/2016 19:16      #[id number]             Until signed, this is  an unconfirmed preliminary report that may contain      errors and is subject to change.                   10/06/16 1404  <Electronically signed by Reymundo Lomax MD>                                      [facility]                PACS RADIOLOGY REPORT            Patient: JESUS LAY   Acct: #[id number]   Report: #[id number]            UNIT #: [id number]    DOS: 20 1528      INSURANCE:[employer]   ORDER #:[id number]      LOCATION:[facility]     : 1960            PROVIDERS      ADMITTING:     ATTENDING: Reymundo Lomax      FAMILY:  SONIA PETIT   ORDERING:  Reymundo Lomax         OTHER:    DICTATING:  ROLF MARINO MD            REQ #:[id number]   [facility]      REASON FOR EXAM:  CURRENT      REASON FOR VISIT:  CURRENT            *******Signed******         PROCEDURE:   CT LOW DOSE CHEST SCREENING             COMPARISON:   [facility], CT, CT LOW DOSE CHEST SCREENING,     2019, 13:12.             REASON FOR SCREENING:   Patient is 60 years of age, asymptomatic, and has a     smoking history of more       than 30 pack years.      SMOKING STATUS:   Current smoker.                  SCREENING VISIT:   <>                  TECHNIQUE:   Axial unenhanced LDCT images from the apices through mid-kidney     were obtained.       Evaluation of solid organs and vascular structures is suboptimal due to the lack    of IV contrast.       Imaging was performed on a Siemens Emotion 16 CT scanner.             RADIATION:   CT Dose Index Vol (CTDIvol):    3.11  mGy         Dose Length Product (DLP):    112  mGy-cm             DIAGNOSTIC QUALITY:   Satisfactory             FINDINGS:         1-2 mm nodule superior segment right lower lobe (image 56) is stable.      Centrilobular emphysema.  No       new pulmonary nodules.  No adenopathy in the chest.  No definite acute findings     are seen in  the       included upper abdomen.  Left adrenal adenoma is similar.  1.9 cm cyst in the     upper pole of the       left kidney.  No aggressive appearing bone change.             CONCLUSION:   Lung rads category 2-benign.  Per the ACR lung rads     recommendations, suggest the       patient continue with yearly low-dose lung cancer screening.              ROLF MARINO MD             Electronically Signed and Approved By: ROLF MARINO MD on 6/02/2020 at 16:21                        Until signed, this is an unconfirmed preliminary report that may contain      errors and is subject to change.                                              DOWER:      D:06/02/20 1629            Allergies and Medications      Allergies:        Coded Allergies:             NO KNOWN ALLERGIES (Unverified , 11/4/20)      Medications    Last Reconciled on 11/4/20 16:21 by REYMUNDO LOMAX MD      Umeclidinium Bromide (Incruse Ellipta) 62.5 Mcg Blst.w.dev      1 PUFF INH RTQDAY, #1 MDI 3 Refills         Prov: LISETH RODRIGUEZ PA-C         8/18/20       MDI-Advair 500/50 (Advair 500/50 Diskus) 1 Each Blst.w.dev      1 PUFF INH RTBID, #1 INH 5 Refills         Prov: LISETH RODRIGUEZ PA-C         9/5/19       Albuterol Sulfate (Albuterol Sulfate) 1.25 Mg/3 Ml Vial.neb      1.25 MG INH Q4-6H PRN for SHORTNESS OF BREATH, #120 NEB         Prov: LISETH RODRIGUEZ PA-C         9/5/19       Lisinopril* (Lisinopril*) 5 Mg Tablet      5 MG PO QDAY, #60 TAB 0 Refills         Reported         7/19/18       metFORMIN HCl (metFORMIN HCl) 850 Mg Tablet      850 MG PO QDAY, #30 TAB 0 Refills         Reported         1/20/18       Levothyroxine (Levothyroxine) 0.15 Mg Tablet      0.125 MG PO QDAY@07, #30 TAB 0 Refills         Reported         1/20/18       MDI-Albuterol (Ventolin HFA) 8 Gm Hfa.aer.ad      2 PUFFS INH Q4-6H PRN for DYSPNEA, #1 INH 6 Refills         Prov: Reymundo Lomax         8/4/17       ALPRAZolam (ALPRAZolam) 0.5 Mg Tablet      0.5 MG PO TID  PRN for ANXIETY, #90 TAB         Reported         9/8/16       Simvastatin (Simvastatin*) 20 Mg Tablet      20 MG PO HS, #30 TAB 0 Refills         Reported         9/8/16       amLODIPine (amLODIPine) 5 Mg Tablet      5 MG PO QDAY, #30 TAB         Reported         9/8/16            Assessment      Fatigue R53.83, Shortness of Air  R06.02            Plan      Orders:  Phone Eval 11-20 mi [id number]      Instructions      * Chronic conditions reviewed and taken in consideration for today's treatment       plan.      * Plan Of Care: ()      * Patient instructed to seek medical attention urgently for new or worsening       symptoms.      * Patient was educated/instructed on their diagnosis, treatment and medications       today.      * Recommend self monitoring. Instructions given.      * Recommend self quarantine for 14 days.      * Recommend self quarantine until without fever for 72 hours without using fever       reducing medications.      * Recommends over the counter medications for symptom management.            PLAN:      The patient is a 60 year old female with chronic hypoxic respiratory failure,     chronic obstructive pulmonary disease, obesity hypoventilation syndrome and     obstructive sleep apnea.             1. Chronic obstructive pulmonary disease. Continue with advair 500/50, continue     incruse and continue albuterol as needed.       2. Chronic hypoxic and hypercapneic respiratory failure and obstructive sleep     apnea. Continue BiPAP with sleep. I reviewed the BiPAP usage data and she is     compliant with it and heart rate apnea hypopnea index is good.       3. She should get her flu vaccine as soon as possible.       4. I will refill her medications today.       5. Follow up in 4-6 months earlier if needed.            Electronically signed by Reymundo Lomax  11/25/2020 19:01       Disclaimer: Converted document may not contain table formatting or lab diagrams. Please see [employer] for the authenticated document.

## 2021-06-03 ENCOUNTER — HOSPITAL ENCOUNTER (OUTPATIENT)
Dept: OTHER | Facility: HOSPITAL | Age: 61
Discharge: HOME | End: 2021-06-03
Attending: INTERNAL MEDICINE

## 2021-06-11 ENCOUNTER — TELEPHONE (OUTPATIENT)
Dept: PULMONOLOGY | Facility: CLINIC | Age: 61
End: 2021-06-11

## 2021-06-14 ENCOUNTER — TELEPHONE (OUTPATIENT)
Dept: PULMONOLOGY | Facility: CLINIC | Age: 61
End: 2021-06-14

## 2021-06-15 NOTE — TELEPHONE ENCOUNTER
CT chest is unchanged and shows no suspicious nodules or lesions.  Previous adrenal nodule is same,     Can discuss further in next visit.

## 2021-06-17 NOTE — TELEPHONE ENCOUNTER
No acute disease in the chest and no significant change since previous study.      Can be discussed further in next visit.

## 2021-06-24 RX ORDER — BUDESONIDE AND FORMOTEROL FUMARATE DIHYDRATE 160; 4.5 UG/1; UG/1
2 AEROSOL RESPIRATORY (INHALATION) 2 TIMES DAILY
Qty: 1 EACH | Refills: 12 | Status: SHIPPED | OUTPATIENT
Start: 2021-06-24 | End: 2022-09-19 | Stop reason: ALTCHOICE

## 2021-07-29 ENCOUNTER — TELEPHONE (OUTPATIENT)
Dept: PULMONOLOGY | Facility: CLINIC | Age: 61
End: 2021-07-29

## 2021-08-03 ENCOUNTER — TELEPHONE (OUTPATIENT)
Dept: PULMONOLOGY | Facility: CLINIC | Age: 61
End: 2021-08-03

## 2021-08-03 NOTE — TELEPHONE ENCOUNTER
Patient called and left a voicemail stating that she is waiting on a return call from [facility] about which inhaler she should be taking. She is also out of one of them, so she needs to know very soon. Please call patient, thank you!

## 2021-08-04 ENCOUNTER — TELEPHONE (OUTPATIENT)
Dept: PULMONOLOGY | Facility: CLINIC | Age: 61
End: 2021-08-04

## 2022-02-07 ENCOUNTER — TELEPHONE (OUTPATIENT)
Dept: PULMONOLOGY | Facility: CLINIC | Age: 62
End: 2022-02-07

## 2022-02-08 NOTE — TELEPHONE ENCOUNTER
Called patient back. She didn't answer, I left a vm. Next time call the patient and see exactly what she needs? Per Alison Guy. She also canceled her appointment via interface.

## 2022-09-16 ENCOUNTER — TELEPHONE (OUTPATIENT)
Dept: PULMONOLOGY | Facility: CLINIC | Age: 62
End: 2022-09-16

## 2022-09-16 NOTE — TELEPHONE ENCOUNTER
Called patient to obtain more information for patient chart due to an upcoming appointment. Wasn't able to leave a message to ask patient to call the office back.

## 2022-09-19 ENCOUNTER — OFFICE VISIT (OUTPATIENT)
Dept: PULMONOLOGY | Facility: CLINIC | Age: 62
End: 2022-09-19

## 2022-09-19 VITALS — WEIGHT: 200 LBS | BODY MASS INDEX: 36.8 KG/M2 | HEIGHT: 62 IN

## 2022-09-19 DIAGNOSIS — J43.2 CENTRILOBULAR EMPHYSEMA: Primary | ICD-10-CM

## 2022-09-19 DIAGNOSIS — F17.210 SMOKING GREATER THAN 40 PACK YEARS: ICD-10-CM

## 2022-09-19 DIAGNOSIS — E27.8 ADRENAL NODULE: ICD-10-CM

## 2022-09-19 DIAGNOSIS — J96.12 CHRONIC RESPIRATORY FAILURE WITH HYPOXIA AND HYPERCAPNIA: ICD-10-CM

## 2022-09-19 DIAGNOSIS — J96.11 CHRONIC RESPIRATORY FAILURE WITH HYPOXIA AND HYPERCAPNIA: ICD-10-CM

## 2022-09-19 PROBLEM — E27.9 ADRENAL NODULE: Status: ACTIVE | Noted: 2022-09-19

## 2022-09-19 PROCEDURE — 99213 OFFICE O/P EST LOW 20 MIN: CPT | Performed by: INTERNAL MEDICINE

## 2022-09-19 RX ORDER — ALBUTEROL SULFATE 90 UG/1
AEROSOL, METERED RESPIRATORY (INHALATION)
COMMUNITY
End: 2022-09-19 | Stop reason: SDUPTHER

## 2022-09-19 RX ORDER — LEVOTHYROXINE SODIUM 0.12 MG/1
TABLET ORAL
COMMUNITY

## 2022-09-19 RX ORDER — AMLODIPINE BESYLATE 5 MG/1
TABLET ORAL
COMMUNITY

## 2022-09-19 RX ORDER — AMITRIPTYLINE HYDROCHLORIDE 10 MG/1
TABLET, FILM COATED ORAL
COMMUNITY

## 2022-09-19 RX ORDER — FLUTICASONE PROPIONATE AND SALMETEROL 500; 50 UG/1; UG/1
1 POWDER RESPIRATORY (INHALATION)
Qty: 1 EACH | Refills: 11 | Status: SHIPPED | OUTPATIENT
Start: 2022-09-19

## 2022-09-19 RX ORDER — NICOTINE 21 MG/24HR
PATCH, TRANSDERMAL 24 HOURS TRANSDERMAL
COMMUNITY

## 2022-09-19 RX ORDER — FLUTICASONE PROPIONATE AND SALMETEROL 500; 50 UG/1; UG/1
POWDER RESPIRATORY (INHALATION)
COMMUNITY
End: 2022-09-19 | Stop reason: SDUPTHER

## 2022-09-19 RX ORDER — GUAIFENESIN 600 MG/1
TABLET, EXTENDED RELEASE ORAL
COMMUNITY

## 2022-09-19 RX ORDER — IPRATROPIUM BROMIDE 17 UG/1
AEROSOL, METERED RESPIRATORY (INHALATION)
COMMUNITY
End: 2022-09-19 | Stop reason: SDUPTHER

## 2022-09-19 RX ORDER — ALBUTEROL SULFATE 90 UG/1
2 AEROSOL, METERED RESPIRATORY (INHALATION) EVERY 4 HOURS PRN
Qty: 1 G | Refills: 11 | Status: SHIPPED | OUTPATIENT
Start: 2022-09-19

## 2022-09-19 RX ORDER — IPRATROPIUM BROMIDE 17 UG/1
2 AEROSOL, METERED RESPIRATORY (INHALATION)
Qty: 1 EACH | Refills: 11 | Status: SHIPPED | OUTPATIENT
Start: 2022-09-19

## 2022-09-19 RX ORDER — SIMVASTATIN 40 MG
TABLET ORAL
COMMUNITY

## 2022-09-19 RX ORDER — ALBUTEROL SULFATE 1.25 MG/3ML
SOLUTION RESPIRATORY (INHALATION)
COMMUNITY

## 2022-09-19 RX ORDER — LEVOTHYROXINE SODIUM 0.15 MG/1
0.12 TABLET ORAL
COMMUNITY
End: 2022-09-19 | Stop reason: SDUPTHER

## 2022-09-19 RX ORDER — ALPRAZOLAM 0.5 MG/1
TABLET ORAL
COMMUNITY

## 2022-09-19 RX ORDER — LISINOPRIL 10 MG/1
TABLET ORAL
COMMUNITY

## 2022-09-19 NOTE — PROGRESS NOTES
Primary Care Provider  Leno Hidalgo MD     Referring Provider  No ref. provider found     Chief Complaint  COPD and Follow-up    Subjective          Hamida Laura presents to [facility]  History of Present Illness  Hamida Laura is a 62 y.o. female patient with history of COPD, chronic hypoxic and hypercapnic respiratory failure, on BiPAP with sleep, oxygen during daytime, chronic smoking.  This is a televisit.  Patient could not return for regular face-to-face clinic visit.  Televisit was done in presence of Rosa Kincaid MA.  Total time spent was 23 minutes.  She has shortness of breath with exertion.  She is currently using Advair 500/51 inhalation twice daily and Atrovent twice daily.  She was on Spiriva, Breo and Symbicort in past which has been stopped.  She has been using albuterol for rescue 2-3 times a week.  She has not needed any antibiotics or steroids since her last office visit.  She received a new BiPAP machine.  The previous was in recall.  And overall she is doing well with it.  She has shortness of breath with heavy exertion.  She has no nausea or vomiting.  No chest pain or chest tightness.  She has not had low-dose lung cancer screening CT scan done yet.  Has not had any flareups of COPD recently.  Continues to smoke half pack a day now.  She has nicotine patch at home but is not using it.    Review of Systems     General:  No Fatigue, No Fever No weight loss or loss of appetite  HEENT: No dysphagia, No Visual Changes, no rhinorrhea  Respiratory:  + cough,+Dyspnea, mucoid phlegm, No Pleuritic Pain, no wheezing, no hemoptysis.  Cardiovascular: Denies chest pain, denies palpitations,+SORENSEN, No Chest Pressure  Gastrointestinal:  No Abdominal Pain, No Nausea, No Vomiting, No Diarrhea  Genitourinary:  No Dysuria, No Frequency, No Hesitancy  Musculoskeletal: No muscle pain or swelling  Endocrine:  No Heat Intolerance, No Cold  Intolerance  Hematologic:  No Bleeding, No Bruising  Psychiatric:  Anxiety, Depression  Neurologic:  No Confusion, no Dysarthria, No Headaches  Skin:  No Rash, No Open Wounds    Family History   Problem Relation Age of Onset   • Cancer Mother         Social History     Socioeconomic History   • Marital status:    Tobacco Use   • Smoking status: Current Every Day Smoker     Packs/day: 0.50     Years: 40.00     Pack years: 20.00     Types: Cigarettes   Vaping Use   • Vaping Use: Never used   Substance and Sexual Activity   • Alcohol use: Never   • Drug use: Never   • Sexual activity: Defer        Past Medical History:   Diagnosis Date   • COPD (chronic obstructive pulmonary disease) (HCC)    • Hypertension         Immunization History   Administered Date(s) Administered   • COVID-19 (MODERNA) 1st, 2nd, 3rd Dose Only 08/17/2021, 09/16/2021, 10/14/2021   • Flu Vaccine Intradermal Quad 18-64YR 10/12/2017   • Flu Vaccine Quad PF >36MO 09/16/2016   • Fluzone Quad >6mos (Multi-dose) 09/18/2018, 09/24/2019   • Pneumococcal Polysaccharide (PPSV23) 09/19/2016         No Known Allergies       Current Outpatient Medications:   •  albuterol sulfate  (90 Base) MCG/ACT inhaler, Inhale 2 puffs Every 4 (Four) Hours As Needed for Wheezing., Disp: 1 g, Rfl: 11  •  ALPRAZolam (XANAX) 0.5 MG tablet, alprazolam 0.5 mg tablet  TAKE ONE TABLET BY MOUTH THREE TIMES A DAY IF NEEDED - MAY CAUSE DROWSINESS, Disp: , Rfl:   •  amitriptyline (ELAVIL) 10 MG tablet, amitriptyline 10 mg tablet  TAKE 1 OR 2 TABLETS AT BEDTIME FOR NERVE PAIN IN THE FEET, Disp: , Rfl:   •  amLODIPine (NORVASC) 5 MG tablet, amlodipine 5 mg tablet  TAKE ONE TABLET BY MOUTH EVERY DAY *TAKE WITH LISINOPRIL, Disp: , Rfl:   •  cyanocobalamin (VITAMIN B-12) 500 MCG tablet, cyanocobalamin (vit B-12) 500 mcg tablet, Disp: , Rfl:   •  Fluticasone-Salmeterol (Advair Diskus) 500-50 MCG/ACT DISKUS, Inhale 1 puff 2 (Two) Times a Day., Disp: 1 each, Rfl: 11  •   "guaiFENesin (MUCINEX) 600 MG 12 hr tablet, Mucus Relief  mg tablet, extended release, Disp: , Rfl:   •  ipratropium (Atrovent HFA) 17 MCG/ACT inhaler, Inhale 2 puffs 4 (Four) Times a Day., Disp: 1 each, Rfl: 11  •  levothyroxine (SYNTHROID, LEVOTHROID) 125 MCG tablet, levothyroxine 125 mcg tablet  TAKE ONE TABLET BY MOUTH ONCE DAILY, Disp: , Rfl:   •  lisinopril (PRINIVIL,ZESTRIL) 10 MG tablet, lisinopril 10 mg tablet  TAKE ONE TABLET BY MOUTH ONCE DAILY, Disp: , Rfl:   •  metFORMIN (GLUCOPHAGE) 850 MG tablet, metformin 850 mg tablet  TAKE ONE TABLET BY MOUTH AT BEDTIME, Disp: , Rfl:   •  simvastatin (ZOCOR) 40 MG tablet, simvastatin 40 mg tablet  TAKE 1 TABLET BY MOUTH ONCE DAILY (REPLACES THE 20 MG), Disp: , Rfl:   •  albuterol (ACCUNEB) 1.25 MG/3ML nebulizer solution, albuterol sulfate 1.25 mg/3 mL solution for nebulization, Disp: , Rfl:   •  nicotine (NICODERM CQ) 14 MG/24HR patch, nicotine 14 mg/24 hr daily transdermal patch, Disp: , Rfl:      Objective   Vital Signs:   Ht 157.5 cm (62\") Comment: pt verbally stated  Wt 90.7 kg (200 lb) Comment: Pt verbally stated  BMI 36.58 kg/m²     Mallampatti classification : 1  Physical Exam  Vital Signs Reviewed  Could not be obtained, this is a televisit.     Result Review :   The following data was reviewed by: Reymundo Lomax MD on 09/19/2022:                Data reviewed: Radiologic studies CT chest from 6/3/21 reviewed.      It showed left adrenal gland density.        Assessment and Plan    Diagnoses and all orders for this visit:    1. Centrilobular emphysema (HCC) (Primary)  -     Fluticasone-Salmeterol (Advair Diskus) 500-50 MCG/ACT DISKUS; Inhale 1 puff 2 (Two) Times a Day.  Dispense: 1 each; Refill: 11  -     albuterol sulfate  (90 Base) MCG/ACT inhaler; Inhale 2 puffs Every 4 (Four) Hours As Needed for Wheezing.  Dispense: 1 g; Refill: 11  -     ipratropium (Atrovent HFA) 17 MCG/ACT inhaler; Inhale 2 puffs 4 (Four) Times a Day.  Dispense: 1 each; " Refill: 11  -      CT Chest Low Dose Cancer Screening WO; Future    2. Chronic respiratory failure with hypoxia and hypercapnia (HCC)  -     Fluticasone-Salmeterol (Advair Diskus) 500-50 MCG/ACT DISKUS; Inhale 1 puff 2 (Two) Times a Day.  Dispense: 1 each; Refill: 11  -     albuterol sulfate  (90 Base) MCG/ACT inhaler; Inhale 2 puffs Every 4 (Four) Hours As Needed for Wheezing.  Dispense: 1 g; Refill: 11  -     ipratropium (Atrovent HFA) 17 MCG/ACT inhaler; Inhale 2 puffs 4 (Four) Times a Day.  Dispense: 1 each; Refill: 11  -      CT Chest Low Dose Cancer Screening WO; Future    3. Smoking greater than 40 pack years  -     Fluticasone-Salmeterol (Advair Diskus) 500-50 MCG/ACT DISKUS; Inhale 1 puff 2 (Two) Times a Day.  Dispense: 1 each; Refill: 11  -     albuterol sulfate  (90 Base) MCG/ACT inhaler; Inhale 2 puffs Every 4 (Four) Hours As Needed for Wheezing.  Dispense: 1 g; Refill: 11  -     ipratropium (Atrovent HFA) 17 MCG/ACT inhaler; Inhale 2 puffs 4 (Four) Times a Day.  Dispense: 1 each; Refill: 11  -      CT Chest Low Dose Cancer Screening WO; Future    4. Adrenal nodule (HCC)      COPD, very severe: Continue with Advair 500/51 inhalation twice daily.  Continue with Atrovent, can increase up to 4 times a day if needed.  Use albuterol as needed.    Low-dose lung cancer screening CT scan was discussed in detail.  Alternatives and options were reviewed.  I discussed with her that it is process and if there is any abnormal finding needs to be followed with further imaging or invasive procedure.  She is agreeable.  She is due for low-dose lung cancer screening CT scan.  I have ordered it today.    Chronic smoking: Patient continues to smoke.  Currently smoking half a pack a day.  Smoking cessation consult was done for more than 5 minutes.  She has nicotine patch at home, however, patient is wanting to work on her own without using the patch.    Chronic hypoxic and hypercapnic respiratory failure:  Continue with BiPAP with sleep.  She has received new machine now and overall is using it well.    Follow Up   Return in about 6 months (around 3/19/2023).  Patient was given instructions and counseling regarding her condition or for health maintenance advice. Please see specific information pulled into the AVS if appropriate.       Electronically signed by Reymundo Lomax MD, 9/19/2022, 16:09 EDT.

## 2022-10-05 ENCOUNTER — HOSPITAL ENCOUNTER (OUTPATIENT)
Dept: CT IMAGING | Facility: HOSPITAL | Age: 62
End: 2022-10-05

## 2022-10-10 ENCOUNTER — HOSPITAL ENCOUNTER (OUTPATIENT)
Dept: CT IMAGING | Facility: HOSPITAL | Age: 62
Discharge: HOME | End: 2022-10-10
Admitting: INTERNAL MEDICINE

## 2022-10-10 DIAGNOSIS — J96.11 CHRONIC RESPIRATORY FAILURE WITH HYPOXIA AND HYPERCAPNIA: ICD-10-CM

## 2022-10-10 DIAGNOSIS — J96.12 CHRONIC RESPIRATORY FAILURE WITH HYPOXIA AND HYPERCAPNIA: ICD-10-CM

## 2022-10-10 DIAGNOSIS — F17.210 SMOKING GREATER THAN 40 PACK YEARS: ICD-10-CM

## 2022-10-10 DIAGNOSIS — J43.2 CENTRILOBULAR EMPHYSEMA: ICD-10-CM

## 2022-10-10 PROCEDURE — 71271 CT THORAX LUNG CANCER SCR C-: CPT

## 2022-10-17 ENCOUNTER — TELEPHONE (OUTPATIENT)
Dept: PULMONOLOGY | Facility: CLINIC | Age: 62
End: 2022-10-17

## 2022-10-17 DIAGNOSIS — F17.210 SMOKING GREATER THAN 40 PACK YEARS: Primary | ICD-10-CM

## 2022-10-17 NOTE — TELEPHONE ENCOUNTER
Patient left a voicemail inquiring about the results of her chest ct she had done 10/10/22. Patient is a [facility] patient. Please advise, thank you.

## 2023-04-25 ENCOUNTER — OFFICE VISIT (OUTPATIENT)
Dept: PULMONOLOGY | Facility: CLINIC | Age: 63
End: 2023-04-25
Payer: COMMERCIAL

## 2023-04-25 VITALS
HEART RATE: 91 BPM | TEMPERATURE: 99.8 F | SYSTOLIC BLOOD PRESSURE: 177 MMHG | OXYGEN SATURATION: 94 % | RESPIRATION RATE: 18 BRPM | HEIGHT: 62 IN | WEIGHT: 200.5 LBS | BODY MASS INDEX: 36.9 KG/M2 | DIASTOLIC BLOOD PRESSURE: 75 MMHG

## 2023-04-25 DIAGNOSIS — J96.11 CHRONIC RESPIRATORY FAILURE WITH HYPOXIA AND HYPERCAPNIA: Primary | ICD-10-CM

## 2023-04-25 DIAGNOSIS — E27.8 ADRENAL NODULE: ICD-10-CM

## 2023-04-25 DIAGNOSIS — F17.210 SMOKING GREATER THAN 40 PACK YEARS: ICD-10-CM

## 2023-04-25 DIAGNOSIS — J96.12 CHRONIC RESPIRATORY FAILURE WITH HYPOXIA AND HYPERCAPNIA: Primary | ICD-10-CM

## 2023-04-25 DIAGNOSIS — J43.2 CENTRILOBULAR EMPHYSEMA: ICD-10-CM

## 2023-04-25 RX ORDER — PREDNISONE 10 MG/1
TABLET ORAL
Qty: 45 TABLET | Refills: 0 | Status: SHIPPED | OUTPATIENT
Start: 2023-04-25

## 2023-04-25 RX ORDER — DOXYCYCLINE HYCLATE 100 MG/1
CAPSULE ORAL
COMMUNITY
Start: 2023-02-23 | End: 2023-04-25

## 2023-04-25 RX ORDER — PREDNISONE 10 MG/1
3 TABLET ORAL DAILY
COMMUNITY
Start: 2023-03-09 | End: 2023-04-25

## 2023-04-25 RX ORDER — IPRATROPIUM BROMIDE 17 UG/1
2 AEROSOL, METERED RESPIRATORY (INHALATION)
Qty: 1 EACH | Refills: 11 | Status: SHIPPED | OUTPATIENT
Start: 2023-04-25

## 2023-04-25 RX ORDER — BUPROPION HYDROCHLORIDE 150 MG/1
150 TABLET, EXTENDED RELEASE ORAL 2 TIMES DAILY
Qty: 60 TABLET | Refills: 4 | Status: SHIPPED | OUTPATIENT
Start: 2023-04-25

## 2023-04-25 RX ORDER — BUPROPION HYDROCHLORIDE 150 MG/1
150 TABLET, EXTENDED RELEASE ORAL 2 TIMES DAILY
Qty: 30 TABLET | Refills: 3 | Status: SHIPPED | OUTPATIENT
Start: 2023-04-25 | End: 2023-04-25

## 2023-04-25 NOTE — PROGRESS NOTES
Primary Care Provider  Leno Hidalgo MD     Referring Provider  No ref. provider found     Chief Complaint  Emphysema, Shortness of Breath, and Follow-up (6 month follow up)    Subjective          Hamida Laura presents to [facility]MON[facility]ARE MEDIC[facility]  History of Present Illness  Hamida Laura is a 63 y.o. female patient with history of COPD, chronic hypoxic and hypercapnic respiratory failure, on BiPAP with sleep, oxygen during daytime, chronic smoking.  She is currently on Advair 500/50, 1 inhalation twice daily and Atrovent which she has been using twice daily.  She has been using albuterol for rescue several times a week.  She was recently seen in her primary care doctor's office, was found to have oxygen saturation in 40s.  She was admitted overnight to [facility], was treated with antibiotics and steroids for COPD exacerbation.  She was discharged home on antibiotics and steroids.  She has not needed any more antibiotics steroids since.  She  continues to smoke half to 1 pack a day.  She is willing to have pneumococcal vaccine.  She has no change in weight or appetite.  She has shortness of breath with minimal exertion.  No chest pain or chest tightness.  She has been having issues with using her BiPAP machine which she received last year.  She uses nasal mask with mouthguard.  However, she has not been able to use it properly.  When I reviewed the data, she has only 1 day data available for review.  She uses it for 8 to 10 hours per night.      Review of Systems   Respiratory: Positive for shortness of breath.    General:  Fatigue, No Fever No weight loss or loss of appetite  HEENT: No dysphagia, No Visual Changes, no rhinorrhea  Respiratory:  + cough,+Dyspnea, mucoid phlegm, No Pleuritic Pain, no wheezing, no hemoptysis.  Cardiovascular: Denies chest pain, denies palpitations,+SORENSEN, No Chest Pressure  Gastrointestinal:  No Abdominal Pain, No  Nausea, No Vomiting, No Diarrhea  Genitourinary:  No Dysuria, No Frequency, No Hesitancy  Musculoskeletal: No muscle pain or swelling  Endocrine:  No Heat Intolerance, No Cold Intolerance  Hematologic:  No Bleeding, No Bruising  Psychiatric:  Anxiety, Depression  Neurologic:  No Confusion, no Dysarthria, No Headaches  Skin:  No Rash, No Open Wounds    Family History   Problem Relation Age of Onset   • Cancer Mother         Social History     Socioeconomic History   • Marital status:    Tobacco Use   • Smoking status: Every Day     Packs/day: 0.50     Years: 40.00     Pack years: 20.00     Types: Cigarettes   Vaping Use   • Vaping Use: Never used   Substance and Sexual Activity   • Alcohol use: Never   • Drug use: Never   • Sexual activity: Defer        Past Medical History:   Diagnosis Date   • COPD (chronic obstructive pulmonary disease)    • Hypertension         Immunization History   Administered Date(s) Administered   • COVID-19 (MODERNA) 1st,2nd,3rd Dose Monovalent 08/17/2021, 09/16/2021, 10/14/2021   • Flu Vaccine Intradermal Quad 18-64YR 10/12/2017   • Flu Vaccine Quad PF >36MO 09/16/2016   • FluLaval/Fluzone >6mos 09/16/2016   • Fluzone Quad >6mos (Multi-dose) 09/18/2018, 09/24/2019   • Pneumococcal Polysaccharide (PPSV23) 09/19/2016         No Known Allergies       Current Outpatient Medications:   •  albuterol (ACCUNEB) 1.25 MG/3ML nebulizer solution, albuterol sulfate 1.25 mg/3 mL solution for nebulization, Disp: , Rfl:   •  albuterol sulfate  (90 Base) MCG/ACT inhaler, Inhale 2 puffs Every 4 (Four) Hours As Needed for Wheezing., Disp: 1 g, Rfl: 11  •  ALPRAZolam (XANAX) 0.5 MG tablet, alprazolam 0.5 mg tablet  TAKE ONE TABLET BY MOUTH THREE TIMES A DAY IF NEEDED - MAY CAUSE DROWSINESS, Disp: , Rfl:   •  amitriptyline (ELAVIL) 10 MG tablet, amitriptyline 10 mg tablet  TAKE 1 OR 2 TABLETS AT BEDTIME FOR NERVE PAIN IN THE FEET, Disp: , Rfl:   •  amLODIPine (NORVASC) 5 MG tablet, Take 1.5  "tablets by mouth Daily., Disp: , Rfl:   •  cyanocobalamin (VITAMIN B-12) 500 MCG tablet, cyanocobalamin (vit B-12) 500 mcg tablet, Disp: , Rfl:   •  Fluticasone-Salmeterol (Advair Diskus) 500-50 MCG/ACT DISKUS, Inhale 1 puff 2 (Two) Times a Day., Disp: 1 each, Rfl: 11  •  ipratropium (Atrovent HFA) 17 MCG/ACT inhaler, Inhale 2 puffs 4 (Four) Times a Day., Disp: 1 each, Rfl: 11  •  levothyroxine (SYNTHROID, LEVOTHROID) 125 MCG tablet, levothyroxine 125 mcg tablet  TAKE ONE TABLET BY MOUTH ONCE DAILY, Disp: , Rfl:   •  metFORMIN (GLUCOPHAGE) 850 MG tablet, metformin 850 mg tablet  TAKE ONE TABLET BY MOUTH AT BEDTIME, Disp: , Rfl:   •  simvastatin (ZOCOR) 40 MG tablet, simvastatin 40 mg tablet  TAKE 1 TABLET BY MOUTH ONCE DAILY (REPLACES THE 20 MG), Disp: , Rfl:   •  buPROPion SR (Wellbutrin SR) 150 MG 12 hr tablet, Take 1 tablet by mouth 2 (Two) Times a Day., Disp: 60 tablet, Rfl: 4  •  guaiFENesin (MUCINEX) 600 MG 12 hr tablet, Mucus Relief  mg tablet, extended release (Patient not taking: Reported on 4/25/2023), Disp: , Rfl:   •  lisinopril (PRINIVIL,ZESTRIL) 10 MG tablet, lisinopril 10 mg tablet  TAKE ONE TABLET BY MOUTH ONCE DAILY (Patient not taking: Reported on 4/25/2023), Disp: , Rfl:   •  nicotine (NICODERM CQ) 14 MG/24HR patch, nicotine 14 mg/24 hr daily transdermal patch (Patient not taking: Reported on 4/25/2023), Disp: , Rfl:   •  predniSONE (DELTASONE) 10 MG tablet, 50mg qday x 3 days, 40mg qday x 3 days, 30mg qday x 3 days, 20mg qday x 3 days, 10mg qday x 3 days, then stop, Disp: 45 tablet, Rfl: 0     Objective   Vital Signs:   /75 (BP Location: Right arm, Patient Position: Sitting, Cuff Size: Adult)   Pulse 91   Temp 99.8 °F (37.7 °C) (Tympanic)   Resp 18   Ht 157.5 cm (62\")   Wt 90.9 kg (200 lb 8 oz)   SpO2 94% Comment: nasal cannula/ 2 liters continous  BMI 36.67 kg/m²     Mallampatti classification : 1  Physical Exam  Vital Signs Reviewed  Obese female, in mild distress, has " conversational dyspnea, on potable oxygen concentrator  HEENT:  PERRL, EOMI.  OP, nares clear, no sinus tenderness  Neck:  Supple, No JVD, no thyromegaly  Lymph: no axillary, cervical, supraclavicular lymphadenopathy noted bilaterally  Chest:  poor aeration, diminished breath sounds, resonant to percussion b/l, no increased work of breathing noted  CV: RRR, no MGR, pulses 2+, equal  Abd:  Soft, NT, ND, + BS, no HSM  EXT:  no clubbing, no cyanosis, No BLE edema  Neuro:  A&Ox3, CN grossly intact, no focal deficits, generalized weakness  Skin: No rashes or lesions noted       Result Review :   The following data was reviewed by: Reymundo Lomax MD on 09/19/2022:                Data reviewed: Radiologic studies CT chest from 6/3/21 reviewed.      It showed left adrenal gland density.        Assessment and Plan    Diagnoses and all orders for this visit:    1. Chronic respiratory failure with hypoxia and hypercapnia (Primary)  -     predniSONE (DELTASONE) 10 MG tablet; 50mg qday x 3 days, 40mg qday x 3 days, 30mg qday x 3 days, 20mg qday x 3 days, 10mg qday x 3 days, then stop  Dispense: 45 tablet; Refill: 0  -     Pneumococcal Conjugate Vaccine 20-Valent (PCV20)  -     ipratropium (Atrovent HFA) 17 MCG/ACT inhaler; Inhale 2 puffs 4 (Four) Times a Day.  Dispense: 1 each; Refill: 11  -     Discontinue: buPROPion SR (WELLBUTRIN SR) 150 MG 12 hr tablet; Take 1 tablet by mouth 2 (Two) Times a Day.  Dispense: 30 tablet; Refill: 3  -     buPROPion SR (Wellbutrin SR) 150 MG 12 hr tablet; Take 1 tablet by mouth 2 (Two) Times a Day.  Dispense: 60 tablet; Refill: 4    2. Centrilobular emphysema  -     predniSONE (DELTASONE) 10 MG tablet; 50mg qday x 3 days, 40mg qday x 3 days, 30mg qday x 3 days, 20mg qday x 3 days, 10mg qday x 3 days, then stop  Dispense: 45 tablet; Refill: 0  -     Pneumococcal Conjugate Vaccine 20-Valent (PCV20)  -     ipratropium (Atrovent HFA) 17 MCG/ACT inhaler; Inhale 2 puffs 4 (Four) Times a Day.   Dispense: 1 each; Refill: 11  -     Discontinue: buPROPion SR (WELLBUTRIN SR) 150 MG 12 hr tablet; Take 1 tablet by mouth 2 (Two) Times a Day.  Dispense: 30 tablet; Refill: 3  -     buPROPion SR (Wellbutrin SR) 150 MG 12 hr tablet; Take 1 tablet by mouth 2 (Two) Times a Day.  Dispense: 60 tablet; Refill: 4    3. Smoking greater than 40 pack years  -     predniSONE (DELTASONE) 10 MG tablet; 50mg qday x 3 days, 40mg qday x 3 days, 30mg qday x 3 days, 20mg qday x 3 days, 10mg qday x 3 days, then stop  Dispense: 45 tablet; Refill: 0  -     Pneumococcal Conjugate Vaccine 20-Valent (PCV20)  -     ipratropium (Atrovent HFA) 17 MCG/ACT inhaler; Inhale 2 puffs 4 (Four) Times a Day.  Dispense: 1 each; Refill: 11  -     Discontinue: buPROPion SR (WELLBUTRIN SR) 150 MG 12 hr tablet; Take 1 tablet by mouth 2 (Two) Times a Day.  Dispense: 30 tablet; Refill: 3  -     buPROPion SR (Wellbutrin SR) 150 MG 12 hr tablet; Take 1 tablet by mouth 2 (Two) Times a Day.  Dispense: 60 tablet; Refill: 4    4. Adrenal nodule  -     predniSONE (DELTASONE) 10 MG tablet; 50mg qday x 3 days, 40mg qday x 3 days, 30mg qday x 3 days, 20mg qday x 3 days, 10mg qday x 3 days, then stop  Dispense: 45 tablet; Refill: 0  -     Pneumococcal Conjugate Vaccine 20-Valent (PCV20)  -     ipratropium (Atrovent HFA) 17 MCG/ACT inhaler; Inhale 2 puffs 4 (Four) Times a Day.  Dispense: 1 each; Refill: 11  -     Discontinue: buPROPion SR (WELLBUTRIN SR) 150 MG 12 hr tablet; Take 1 tablet by mouth 2 (Two) Times a Day.  Dispense: 30 tablet; Refill: 3  -     buPROPion SR (Wellbutrin SR) 150 MG 12 hr tablet; Take 1 tablet by mouth 2 (Two) Times a Day.  Dispense: 60 tablet; Refill: 4      COPD, very severe: Continue with Advair 500/50, 1 inhalation twice daily.  Continue with Atrovent,  increase up to 4 times a day.  Use albuterol as needed.  Her low-dose lung cancer screening CT scan from last year was reviewed.  She is due for low-dose lung cancer screening CT scan in  November 2023.    Chronic smoking: Patient continues to smoke.  Currently smoking half a pack a day.  Smoking cessation consult was done for more than 5 minutes.  She has nicotine patch at home.  I will start her on Wellbutrin 150 mg twice daily as well.    Chronic hypoxic and hypercapnic respiratory failure: Continue with BiPAP with sleep.  She has received new machine now and overall is using it well.  She has issues with significantly, we will review the BiPAP usage data once more data available.  I reviewed the data for her today.  Her BiPAP setting is 20/15, apnea-hypopnea index on it is 1.6.    Follow Up   Return in about 3 months (around 7/25/2023).  Patient was given instructions and counseling regarding her condition or for health maintenance advice. Please see specific information pulled into the AVS if appropriate.       Electronically signed by Reymundo Lomax MD, 4/25/2023, 14:40 EDT.

## 2023-05-15 ENCOUNTER — TELEPHONE (OUTPATIENT)
Dept: PULMONOLOGY | Facility: CLINIC | Age: 63
End: 2023-05-15
Payer: COMMERCIAL

## 2023-05-15 NOTE — TELEPHONE ENCOUNTER
Patients daughter called and has questions regarding what insurances we take. Please return call at [phone], thank you.

## 2023-09-22 DIAGNOSIS — J43.2 CENTRILOBULAR EMPHYSEMA: ICD-10-CM

## 2023-09-22 DIAGNOSIS — F17.210 SMOKING GREATER THAN 40 PACK YEARS: ICD-10-CM

## 2023-09-22 DIAGNOSIS — J96.12 CHRONIC RESPIRATORY FAILURE WITH HYPOXIA AND HYPERCAPNIA: ICD-10-CM

## 2023-09-22 DIAGNOSIS — E27.8 ADRENAL NODULE: ICD-10-CM

## 2023-09-22 DIAGNOSIS — J96.11 CHRONIC RESPIRATORY FAILURE WITH HYPOXIA AND HYPERCAPNIA: ICD-10-CM

## 2023-09-22 RX ORDER — BUPROPION HYDROCHLORIDE 150 MG/1
TABLET, EXTENDED RELEASE ORAL
Qty: 60 TABLET | Refills: 4 | Status: SHIPPED | OUTPATIENT
Start: 2023-09-22

## 2023-10-11 DIAGNOSIS — F17.210 SMOKING GREATER THAN 40 PACK YEARS: Primary | ICD-10-CM

## 2023-12-01 ENCOUNTER — HOSPITAL ENCOUNTER (OUTPATIENT)
Dept: CT IMAGING | Facility: HOSPITAL | Age: 63
Discharge: HOME | End: 2023-12-01
Admitting: NURSE PRACTITIONER
Payer: MEDICAID

## 2023-12-01 DIAGNOSIS — F17.210 SMOKING GREATER THAN 40 PACK YEARS: ICD-10-CM

## 2023-12-01 DIAGNOSIS — F17.210 SMOKING GREATER THAN 40 PACK YEARS: Primary | ICD-10-CM

## 2023-12-01 PROCEDURE — 71271 CT THORAX LUNG CANCER SCR C-: CPT

## 2024-03-12 DIAGNOSIS — F17.210 SMOKING GREATER THAN 40 PACK YEARS: ICD-10-CM

## 2024-03-12 DIAGNOSIS — J43.2 CENTRILOBULAR EMPHYSEMA: ICD-10-CM

## 2024-03-12 DIAGNOSIS — J96.11 CHRONIC RESPIRATORY FAILURE WITH HYPOXIA AND HYPERCAPNIA: ICD-10-CM

## 2024-03-12 DIAGNOSIS — E27.8 ADRENAL NODULE: ICD-10-CM

## 2024-03-12 DIAGNOSIS — J96.12 CHRONIC RESPIRATORY FAILURE WITH HYPOXIA AND HYPERCAPNIA: ICD-10-CM

## 2024-03-12 RX ORDER — BUPROPION HYDROCHLORIDE 150 MG/1
TABLET, EXTENDED RELEASE ORAL
Qty: 60 TABLET | Refills: 4 | OUTPATIENT
Start: 2024-03-12

## 2024-09-27 NOTE — PROGRESS NOTES
Primary Care Provider  Leno Hidalgo MD   Referring Provider  Leno Hidalgo MD    Patient Complaint  Follow-up      Subjective       History of Presenting Illness  Hamida Laura is a pleasant 64 y.o. female who presents to [facility]UL[facility] CRIT[facility]ARE MEDICINE for follow-up appointment.  Patient was referred by family provider.  Patient last saw Dr. Lomax 4/25/2023.  Patient has a history of COPD, chronic hypoxic and hypercapnic respiratory failure, on BiPAP with sleep and benefits from its use, oxygen during daytime, chronic smoking. She is currently on Advair 500/50, 1 inhalation twice daily and Atrovent which she has been using twice daily. She has been using albuterol for rescue several times a week. She continues to smoke half to 1 pack a day.  Her last low-dose lung cancer screening was 12/1/2023 with no evidence of lung cancer.  She will be due for LDCT 12/2024. Patient has not had a PFT done before or been tested for Alpha 1 Antitrypsin.  Patient declines to get a pulmonary function test and alpha-1 antitrypsin test.  Patient states she does not want any additional diagnostics at this time.  She will get her flu shot today.  Patient states she did have an exacerbation of her COPD in August and was in [facility] diagnosed with pneumonia.  Her chest x-ray was negative.  She feels she has completely recovered from that hospitalization.  At present time patient denies dyspnea, coughing, wheezing, headaches, chest pain, weight loss or hemoptysis. Patient denies fevers, chills and night sweats. Hamida Laura is able to perform ADLs without difficulties.  Patient is a bit tearful today as it has been 20 years since the passing of her spouse.  Patient states she does have chronic kidney disease stage IV and decided she does not want any dialysis.    I have personally reviewed the review of systems, past family, social, medical and surgical histories; and  agree with their findings.      Review of Systems   Constitutional: Negative.    HENT: Negative.     Respiratory: Negative.     Cardiovascular: Negative.    Musculoskeletal: Negative.    Neurological: Negative.    Psychiatric/Behavioral: Negative.           Family History   Problem Relation Age of Onset    Cancer Mother         Social History     Socioeconomic History    Marital status:    Tobacco Use    Smoking status: Every Day     Current packs/day: 0.50     Average packs/day: 0.5 packs/day for 40.0 years (20.0 ttl pk-yrs)     Types: Cigarettes   Vaping Use    Vaping status: Never Used   Substance and Sexual Activity    Alcohol use: Never    Drug use: Never    Sexual activity: Defer        Past Medical History:   Diagnosis Date    COPD (chronic obstructive pulmonary disease)     Hypertension         Immunization History   Administered Date(s) Administered    COVID-19 (MODERNA) 1st,2nd,3rd Dose Monovalent 08/17/2021, 09/16/2021, 10/14/2021    Flu Vaccine Intradermal Quad 18-64YR 10/12/2017    Flu Vaccine Quad PF >36MO 09/16/2016    Fluzone (or Fluarix & Flulaval for VFC) >6mos 09/16/2016    Fluzone Quad >6mos (Multi-dose) 09/18/2018, 09/24/2019    Pneumococcal Conjugate 20-Valent (PCV20) 04/25/2023    Pneumococcal Polysaccharide (PPSV23) 09/19/2016       Allergies   Allergen Reactions    Lisinopril Angioedema and Swelling     angioedema          Current Outpatient Medications:     albuterol (ACCUNEB) 1.25 MG/3ML nebulizer solution, Take 3 mL by nebulization Every 4 (Four) Hours As Needed for Shortness of Air or Wheezing., Disp: 360 mL, Rfl: 3    albuterol sulfate  (90 Base) MCG/ACT inhaler, Inhale 2 puffs Every 4 (Four) Hours As Needed for Wheezing., Disp: 1 g, Rfl: 11    ALPRAZolam (XANAX) 0.5 MG tablet, alprazolam 0.5 mg tablet  TAKE ONE TABLET BY MOUTH THREE TIMES A DAY IF NEEDED - MAY CAUSE DROWSINESS, Disp: , Rfl:     amitriptyline (ELAVIL) 10 MG tablet, amitriptyline 10 mg tablet  TAKE 1 OR 2  "TABLETS AT BEDTIME FOR NERVE PAIN IN THE FEET, Disp: , Rfl:     amLODIPine (NORVASC) 5 MG tablet, Take 1.5 tablets by mouth Daily., Disp: , Rfl:     atorvastatin (LIPITOR) 20 MG tablet, Take 1 tablet by mouth., Disp: , Rfl:     betamethasone dipropionate (DIPROSONE) 0.05 % cream, Apply  topically to the appropriate area as directed., Disp: , Rfl:     cyanocobalamin (VITAMIN B-12) 500 MCG tablet, cyanocobalamin (vit B-12) 500 mcg tablet, Disp: , Rfl:     Fluticasone-Salmeterol (Advair Diskus) 500-50 MCG/ACT DISKUS, Inhale 1 puff 2 (Two) Times a Day., Disp: 1 each, Rfl: 11    ipratropium (Atrovent HFA) 17 MCG/ACT inhaler, Inhale 2 puffs 2 (Two) Times a Day., Disp: 38.7 g, Rfl: 3    levothyroxine (SYNTHROID, LEVOTHROID) 125 MCG tablet, levothyroxine 125 mcg tablet  TAKE ONE TABLET BY MOUTH ONCE DAILY, Disp: , Rfl:     metFORMIN (GLUCOPHAGE) 850 MG tablet, metformin 850 mg tablet  TAKE ONE TABLET BY MOUTH AT BEDTIME, Disp: , Rfl:     simvastatin (ZOCOR) 40 MG tablet, simvastatin 40 mg tablet  TAKE 1 TABLET BY MOUTH ONCE DAILY (REPLACES THE 20 MG), Disp: , Rfl:     vitamin D (ERGOCALCIFEROL) 1.25 MG (04988 UT) capsule capsule, Take 1 capsule by mouth Every 14 (Fourteen) Days., Disp: , Rfl:          Vital Signs   /65 (BP Location: Left arm, Patient Position: Sitting, Cuff Size: Adult)   Pulse 99   Temp 98.5 °F (36.9 °C)   Resp 16   Ht 157.5 cm (62\")   Wt 83.9 kg (185 lb)   SpO2 92% Comment: room air  BMI 33.84 kg/m²       Objective     Physical Exam  Vitals reviewed.   Constitutional:       General: She is not in acute distress.     Appearance: Normal appearance. She is not ill-appearing.   HENT:      Head: Normocephalic and atraumatic.      Nose: Nose normal.      Mouth/Throat:      Mouth: Mucous membranes are moist.      Pharynx: Oropharynx is clear.   Eyes:      Extraocular Movements: Extraocular movements intact.      Conjunctiva/sclera: Conjunctivae normal.      Pupils: Pupils are equal, round, and " reactive to light.   Cardiovascular:      Rate and Rhythm: Normal rate and regular rhythm.      Pulses: Normal pulses.      Heart sounds: Normal heart sounds.   Pulmonary:      Effort: Pulmonary effort is normal. No respiratory distress.      Breath sounds: Normal breath sounds. No stridor. No wheezing, rhonchi or rales.   Abdominal:      General: Bowel sounds are normal.   Musculoskeletal:         General: Normal range of motion.      Cervical back: Normal range of motion and neck supple.   Skin:     General: Skin is warm and dry.   Neurological:      Mental Status: She is alert and oriented to person, place, and time.   Psychiatric:         Behavior: Behavior normal.         Results Review  I have personally reviewed the prior office notes, hospital records, labs, and diagnostics.    Assessment         Patient Active Problem List   Diagnosis    Chronic respiratory failure with hypoxia and hypercapnia    Centrilobular emphysema    Smoking greater than 40 pack years    Adrenal nodule        Plan     Diagnoses and all orders for this visit:    1. Cigarette nicotine dependence without complication (Primary)  Comments:  Low-dose CT will be ordered for December 2024, will notify of results when available, she has cut down to one cigarette a day  Orders:  -      CT Chest Low Dose Cancer Screening WO; Future  -     Cancel: Complete PFT - Pre & Post Bronchodilator; Future  -     Cancel: Alpha - 1 - Antitrypsin Phenotype; Future  -     albuterol (ACCUNEB) 1.25 MG/3ML nebulizer solution; Take 3 mL by nebulization Every 4 (Four) Hours As Needed for Shortness of Air or Wheezing.  Dispense: 360 mL; Refill: 3  -     albuterol sulfate  (90 Base) MCG/ACT inhaler; Inhale 2 puffs Every 4 (Four) Hours As Needed for Wheezing.  Dispense: 1 g; Refill: 11  -     Fluticasone-Salmeterol (Advair Diskus) 500-50 MCG/ACT DISKUS; Inhale 1 puff 2 (Two) Times a Day.  Dispense: 1 each; Refill: 11  -     ipratropium (Atrovent HFA) 17 MCG/ACT  inhaler; Inhale 2 puffs 2 (Two) Times a Day.  Dispense: 38.7 g; Refill: 3    2. Centrilobular emphysema  -     Cancel: Complete PFT - Pre & Post Bronchodilator; Future  -     Cancel: Alpha - 1 - Antitrypsin Phenotype; Future  -     albuterol (ACCUNEB) 1.25 MG/3ML nebulizer solution; Take 3 mL by nebulization Every 4 (Four) Hours As Needed for Shortness of Air or Wheezing.  Dispense: 360 mL; Refill: 3  -     albuterol sulfate  (90 Base) MCG/ACT inhaler; Inhale 2 puffs Every 4 (Four) Hours As Needed for Wheezing.  Dispense: 1 g; Refill: 11  -     Fluticasone-Salmeterol (Advair Diskus) 500-50 MCG/ACT DISKUS; Inhale 1 puff 2 (Two) Times a Day.  Dispense: 1 each; Refill: 11  -     ipratropium (Atrovent HFA) 17 MCG/ACT inhaler; Inhale 2 puffs 2 (Two) Times a Day.  Dispense: 38.7 g; Refill: 3    3. Chronic respiratory failure with hypoxia and hypercapnia  Comments:  Continues with O2 at 2 L at night for nocturnal hypoxia and use of a BiPAP machine and benefits from its use  Orders:  -     Cancel: Complete PFT - Pre & Post Bronchodilator; Future  -     Cancel: Alpha - 1 - Antitrypsin Phenotype; Future  -     albuterol (ACCUNEB) 1.25 MG/3ML nebulizer solution; Take 3 mL by nebulization Every 4 (Four) Hours As Needed for Shortness of Air or Wheezing.  Dispense: 360 mL; Refill: 3  -     albuterol sulfate  (90 Base) MCG/ACT inhaler; Inhale 2 puffs Every 4 (Four) Hours As Needed for Wheezing.  Dispense: 1 g; Refill: 11  -     Fluticasone-Salmeterol (Advair Diskus) 500-50 MCG/ACT DISKUS; Inhale 1 puff 2 (Two) Times a Day.  Dispense: 1 each; Refill: 11  -     ipratropium (Atrovent HFA) 17 MCG/ACT inhaler; Inhale 2 puffs 2 (Two) Times a Day.  Dispense: 38.7 g; Refill: 3    4. Need for influenza vaccination  -     Fluzone >6mos (3627-3313)         5.  Patient states she does not want any additional diagnostics done, declines alpha-1 antitrypsin test, declines pulmonary function test.  Patient states she is very  claustrophobic and had a pulmonary function test many years ago and does not do well with small spaces.  Patient is agreeable to get her low-dose lung cancer screening in December and get her flu shot today.    Smoking status:  reports that she has been smoking cigarettes. She has a 20 pack-year smoking history. She does not have any smokeless tobacco history on file.    Vaccination status: Reviewed  Immunization History   Administered Date(s) Administered    COVID-19 (MODERNA) 1st,2nd,3rd Dose Monovalent 08/17/2021, 09/16/2021, 10/14/2021    Flu Vaccine Intradermal Quad 18-64YR 10/12/2017    Flu Vaccine Quad PF >36MO 09/16/2016    Fluzone (or Fluarix & Flulaval for VFC) >6mos 09/16/2016    Fluzone Quad >6mos (Multi-dose) 09/18/2018, 09/24/2019    Pneumococcal Conjugate 20-Valent (PCV20) 04/25/2023    Pneumococcal Polysaccharide (PPSV23) 09/19/2016        Medications personally reviewed    Follow Up  Return in about 14 months (around 12/15/2025).    Patient was given instructions and counseling regarding her condition or for health maintenance advice. Please see specific information pulled into the AVS if appropriate.     I spent 36 minutes caring for Hamida Laura on this date of service. This time includes time spent by me in the following activities:preparing for the visit, reviewing tests, obtaining and/or reviewing a separately obtained history, performing a medically appropriate examination and/or evaluation, counseling and educating the patient/family/caregiver, ordering medications, tests, or procedures, documenting information in the medical record, independently interpreting results and communicating that information with the patient/family/caregiver and answered questions family members, discuss medications.

## 2024-10-01 ENCOUNTER — OFFICE VISIT (OUTPATIENT)
Dept: PULMONOLOGY | Facility: CLINIC | Age: 64
End: 2024-10-01
Payer: MEDICAID

## 2024-10-01 VITALS
TEMPERATURE: 98.5 F | OXYGEN SATURATION: 92 % | HEIGHT: 62 IN | DIASTOLIC BLOOD PRESSURE: 65 MMHG | RESPIRATION RATE: 16 BRPM | HEART RATE: 99 BPM | WEIGHT: 185 LBS | BODY MASS INDEX: 34.04 KG/M2 | SYSTOLIC BLOOD PRESSURE: 173 MMHG

## 2024-10-01 DIAGNOSIS — F17.210 CIGARETTE NICOTINE DEPENDENCE WITHOUT COMPLICATION: Primary | ICD-10-CM

## 2024-10-01 DIAGNOSIS — J43.2 CENTRILOBULAR EMPHYSEMA: ICD-10-CM

## 2024-10-01 DIAGNOSIS — J96.11 CHRONIC RESPIRATORY FAILURE WITH HYPOXIA AND HYPERCAPNIA: ICD-10-CM

## 2024-10-01 DIAGNOSIS — Z23 NEED FOR INFLUENZA VACCINATION: ICD-10-CM

## 2024-10-01 DIAGNOSIS — J96.12 CHRONIC RESPIRATORY FAILURE WITH HYPOXIA AND HYPERCAPNIA: ICD-10-CM

## 2024-10-01 PROCEDURE — 90656 IIV3 VACC NO PRSV 0.5 ML IM: CPT | Performed by: NURSE PRACTITIONER

## 2024-10-01 PROCEDURE — 1160F RVW MEDS BY RX/DR IN RCRD: CPT | Performed by: NURSE PRACTITIONER

## 2024-10-01 PROCEDURE — 1159F MED LIST DOCD IN RCRD: CPT | Performed by: NURSE PRACTITIONER

## 2024-10-01 PROCEDURE — 90471 IMMUNIZATION ADMIN: CPT | Performed by: NURSE PRACTITIONER

## 2024-10-01 PROCEDURE — 99214 OFFICE O/P EST MOD 30 MIN: CPT | Performed by: NURSE PRACTITIONER

## 2024-10-01 RX ORDER — ALBUTEROL SULFATE 90 UG/1
2 INHALANT RESPIRATORY (INHALATION) EVERY 4 HOURS PRN
Qty: 1 G | Refills: 11 | Status: SHIPPED | OUTPATIENT
Start: 2024-10-01

## 2024-10-01 RX ORDER — IPRATROPIUM BROMIDE 17 UG/1
2 AEROSOL, METERED RESPIRATORY (INHALATION)
Qty: 38.7 G | Refills: 3 | Status: SHIPPED | OUTPATIENT
Start: 2024-10-01

## 2024-10-01 RX ORDER — BETAMETHASONE DIPROPIONATE 0.5 MG/G
CREAM TOPICAL
COMMUNITY
Start: 2024-08-23

## 2024-10-01 RX ORDER — FLUTICASONE PROPIONATE AND SALMETEROL 500; 50 UG/1; UG/1
1 POWDER RESPIRATORY (INHALATION)
Qty: 1 EACH | Refills: 11 | Status: SHIPPED | OUTPATIENT
Start: 2024-10-01

## 2024-10-01 RX ORDER — ALBUTEROL SULFATE 1.25 MG/3ML
1 SOLUTION RESPIRATORY (INHALATION) EVERY 4 HOURS PRN
Qty: 360 ML | Refills: 3 | Status: SHIPPED | OUTPATIENT
Start: 2024-10-01

## 2024-10-01 RX ORDER — ATORVASTATIN CALCIUM 20 MG/1
20 TABLET, FILM COATED ORAL
COMMUNITY
Start: 2024-08-23

## 2024-10-01 RX ORDER — ERGOCALCIFEROL 1.25 MG/1
50000 CAPSULE, LIQUID FILLED ORAL
COMMUNITY

## 2024-12-18 ENCOUNTER — HOSPITAL ENCOUNTER (OUTPATIENT)
Dept: CT IMAGING | Facility: HOSPITAL | Age: 64
Discharge: HOME | End: 2024-12-18
Admitting: NURSE PRACTITIONER
Payer: MEDICAID

## 2024-12-18 DIAGNOSIS — F17.210 CIGARETTE NICOTINE DEPENDENCE WITHOUT COMPLICATION: ICD-10-CM

## 2024-12-18 PROCEDURE — 71271 CT THORAX LUNG CANCER SCR C-: CPT

## 2024-12-20 DIAGNOSIS — F17.210 CIGARETTE NICOTINE DEPENDENCE WITHOUT COMPLICATION: Primary | ICD-10-CM
